# Patient Record
Sex: FEMALE | Race: WHITE | NOT HISPANIC OR LATINO | Employment: OTHER | URBAN - NONMETROPOLITAN AREA
[De-identification: names, ages, dates, MRNs, and addresses within clinical notes are randomized per-mention and may not be internally consistent; named-entity substitution may affect disease eponyms.]

---

## 2018-10-19 ENCOUNTER — HOSPITAL ENCOUNTER (OUTPATIENT)
Facility: HOSPITAL | Age: 73
Setting detail: OBSERVATION
Discharge: HOME | End: 2018-10-21
Attending: EMERGENCY MEDICINE | Admitting: INTERNAL MEDICINE

## 2018-10-19 DIAGNOSIS — R77.8 ELEVATED TROPONIN: Primary | ICD-10-CM

## 2018-10-19 LAB
HOLD SPECIMEN: NORMAL
TROPONIN I SERPL-MCNC: 0.09 NG/ML (ref 0–0.03)
WHOLE BLOOD HOLD SPECIMEN: NORMAL
WHOLE BLOOD HOLD SPECIMEN: NORMAL

## 2018-10-19 PROCEDURE — 93010 ELECTROCARDIOGRAM REPORT: CPT | Performed by: INTERNAL MEDICINE

## 2018-10-19 PROCEDURE — 93005 ELECTROCARDIOGRAM TRACING: CPT

## 2018-10-19 PROCEDURE — 84484 ASSAY OF TROPONIN QUANT: CPT | Performed by: INTERNAL MEDICINE

## 2018-10-19 PROCEDURE — 99285 EMERGENCY DEPT VISIT HI MDM: CPT

## 2018-10-19 PROCEDURE — G0378 HOSPITAL OBSERVATION PER HR: HCPCS

## 2018-10-19 PROCEDURE — 84484 ASSAY OF TROPONIN QUANT: CPT | Performed by: EMERGENCY MEDICINE

## 2018-10-19 PROCEDURE — 93005 ELECTROCARDIOGRAM TRACING: CPT | Performed by: EMERGENCY MEDICINE

## 2018-10-19 RX ORDER — HYDROXYCHLOROQUINE SULFATE 200 MG/1
200 TABLET, FILM COATED ORAL
COMMUNITY

## 2018-10-19 RX ORDER — ACETAMINOPHEN 325 MG/1
650 TABLET ORAL EVERY 4 HOURS PRN
Status: DISCONTINUED | OUTPATIENT
Start: 2018-10-19 | End: 2018-10-21 | Stop reason: HOSPADM

## 2018-10-19 RX ORDER — MELATONIN
2000 2 TIMES DAILY
COMMUNITY

## 2018-10-19 RX ORDER — HYDROXYCHLOROQUINE SULFATE 200 MG/1
100 TABLET, FILM COATED ORAL NIGHTLY
COMMUNITY
End: 2020-07-31 | Stop reason: SDUPTHER

## 2018-10-19 RX ORDER — NITROGLYCERIN 0.4 MG/1
0.4 TABLET SUBLINGUAL
Status: DISCONTINUED | OUTPATIENT
Start: 2018-10-19 | End: 2018-10-21 | Stop reason: HOSPADM

## 2018-10-19 RX ORDER — SODIUM CHLORIDE 0.9 % (FLUSH) 0.9 %
3 SYRINGE (ML) INJECTION EVERY 12 HOURS SCHEDULED
Status: DISCONTINUED | OUTPATIENT
Start: 2018-10-20 | End: 2018-10-21 | Stop reason: HOSPADM

## 2018-10-19 RX ORDER — ASPIRIN 81 MG/1
81 TABLET ORAL DAILY
Status: DISCONTINUED | OUTPATIENT
Start: 2018-10-20 | End: 2018-10-21 | Stop reason: HOSPADM

## 2018-10-19 RX ORDER — ASPIRIN 81 MG/1
81 TABLET, CHEWABLE ORAL DAILY
COMMUNITY
End: 2019-10-29 | Stop reason: ALTCHOICE

## 2018-10-19 RX ORDER — ONDANSETRON 2 MG/ML
4 INJECTION INTRAMUSCULAR; INTRAVENOUS EVERY 6 HOURS PRN
Status: DISCONTINUED | OUTPATIENT
Start: 2018-10-19 | End: 2018-10-21 | Stop reason: HOSPADM

## 2018-10-19 RX ORDER — SODIUM CHLORIDE 0.9 % (FLUSH) 0.9 %
3-10 SYRINGE (ML) INJECTION AS NEEDED
Status: DISCONTINUED | OUTPATIENT
Start: 2018-10-19 | End: 2018-10-21 | Stop reason: HOSPADM

## 2018-10-19 RX ORDER — PREDNISONE 1 MG/1
1 TABLET ORAL 2 TIMES DAILY
COMMUNITY

## 2018-10-19 RX ORDER — CARBIDOPA 25 MG/1
25 TABLET ORAL 3 TIMES DAILY
Status: ON HOLD | COMMUNITY
End: 2018-10-19

## 2018-10-19 RX ORDER — ONDANSETRON 4 MG/1
4 TABLET, FILM COATED ORAL EVERY 6 HOURS PRN
Status: DISCONTINUED | OUTPATIENT
Start: 2018-10-19 | End: 2018-10-21 | Stop reason: HOSPADM

## 2018-10-19 RX ORDER — LANOLIN ALCOHOL/MO/W.PET/CERES
400 CREAM (GRAM) TOPICAL DAILY
COMMUNITY

## 2018-10-19 RX ORDER — RALOXIFENE HYDROCHLORIDE 60 MG/1
60 TABLET, FILM COATED ORAL DAILY
COMMUNITY

## 2018-10-19 RX ORDER — CYCLOBENZAPRINE HCL 10 MG
10 TABLET ORAL 3 TIMES DAILY PRN
COMMUNITY
End: 2019-10-11

## 2018-10-19 RX ORDER — PREDNISONE 1 MG/1
1 TABLET ORAL
Status: DISCONTINUED | OUTPATIENT
Start: 2018-10-20 | End: 2018-10-20

## 2018-10-19 RX ORDER — ATORVASTATIN CALCIUM 40 MG/1
80 TABLET, FILM COATED ORAL NIGHTLY
Status: DISCONTINUED | OUTPATIENT
Start: 2018-10-20 | End: 2018-10-21 | Stop reason: HOSPADM

## 2018-10-19 RX ORDER — HYDROXYCHLOROQUINE SULFATE 200 MG/1
200 TABLET, FILM COATED ORAL DAILY
Status: DISCONTINUED | OUTPATIENT
Start: 2018-10-20 | End: 2018-10-20

## 2018-10-19 RX ORDER — ASPIRIN 81 MG/1
324 TABLET, CHEWABLE ORAL ONCE
Status: DISCONTINUED | OUTPATIENT
Start: 2018-10-19 | End: 2018-10-20 | Stop reason: SDUPTHER

## 2018-10-19 RX ORDER — RALOXIFENE HYDROCHLORIDE 60 MG/1
60 TABLET, FILM COATED ORAL DAILY
Status: DISCONTINUED | OUTPATIENT
Start: 2018-10-20 | End: 2018-10-21 | Stop reason: HOSPADM

## 2018-10-19 RX ORDER — CYCLOBENZAPRINE HCL 10 MG
10 TABLET ORAL 3 TIMES DAILY PRN
Status: DISCONTINUED | OUTPATIENT
Start: 2018-10-19 | End: 2018-10-21 | Stop reason: HOSPADM

## 2018-10-19 RX ORDER — ONDANSETRON 4 MG/1
4 TABLET, ORALLY DISINTEGRATING ORAL EVERY 6 HOURS PRN
Status: DISCONTINUED | OUTPATIENT
Start: 2018-10-19 | End: 2018-10-21 | Stop reason: HOSPADM

## 2018-10-19 RX ORDER — PREDNISONE 1 MG/1
2 TABLET ORAL EVERY OTHER DAY
Status: ON HOLD | COMMUNITY
End: 2019-07-29

## 2018-10-19 RX ORDER — AMOXICILLIN AND CLAVULANATE POTASSIUM 875; 125 MG/1; MG/1
1 TABLET, FILM COATED ORAL EVERY 12 HOURS SCHEDULED
COMMUNITY
Start: 2018-10-10 | End: 2018-10-21 | Stop reason: HOSPADM

## 2018-10-19 RX ORDER — ONDANSETRON 4 MG/1
8 TABLET, FILM COATED ORAL EVERY 12 HOURS PRN
COMMUNITY
End: 2018-10-21 | Stop reason: HOSPADM

## 2018-10-19 RX ORDER — CARBIDOPA 25 MG/1
25 TABLET ORAL 3 TIMES DAILY
Status: DISCONTINUED | OUTPATIENT
Start: 2018-10-20 | End: 2018-10-20

## 2018-10-19 RX ORDER — ASPIRIN 81 MG/1
81 TABLET, CHEWABLE ORAL DAILY
Status: DISCONTINUED | OUTPATIENT
Start: 2018-10-20 | End: 2018-10-19 | Stop reason: SDUPTHER

## 2018-10-20 LAB
ANION GAP SERPL CALCULATED.3IONS-SCNC: 7 MMOL/L (ref 4–13)
ARTICHOKE IGE QN: 66 MG/DL (ref 0–99)
BUN BLD-MCNC: 17 MG/DL (ref 5–21)
BUN/CREAT SERPL: 24.6 (ref 7–25)
CALCIUM SPEC-SCNC: 8.7 MG/DL (ref 8.4–10.4)
CHLORIDE SERPL-SCNC: 107 MMOL/L (ref 98–110)
CHOLEST SERPL-MCNC: 122 MG/DL (ref 130–200)
CO2 SERPL-SCNC: 28 MMOL/L (ref 24–31)
CREAT BLD-MCNC: 0.69 MG/DL (ref 0.5–1.4)
DEPRECATED RDW RBC AUTO: 42.5 FL (ref 40–54)
ERYTHROCYTE [DISTWIDTH] IN BLOOD BY AUTOMATED COUNT: 12.4 % (ref 12–15)
GFR SERPL CREATININE-BSD FRML MDRD: 83 ML/MIN/1.73
GLUCOSE BLD-MCNC: 83 MG/DL (ref 70–100)
HBA1C MFR BLD: 5.6 %
HCT VFR BLD AUTO: 35 % (ref 37–47)
HDLC SERPL-MCNC: 38 MG/DL
HGB BLD-MCNC: 12.6 G/DL (ref 12–16)
LDLC/HDLC SERPL: 1.78 {RATIO}
MCH RBC QN AUTO: 34.1 PG (ref 28–32)
MCHC RBC AUTO-ENTMCNC: 36 G/DL (ref 33–36)
MCV RBC AUTO: 94.6 FL (ref 82–98)
PLATELET # BLD AUTO: 186 10*3/MM3 (ref 130–400)
PMV BLD AUTO: 9.8 FL (ref 6–12)
POTASSIUM BLD-SCNC: 4.2 MMOL/L (ref 3.5–5.3)
RBC # BLD AUTO: 3.7 10*6/MM3 (ref 4.2–5.4)
SODIUM BLD-SCNC: 142 MMOL/L (ref 135–145)
TRIGL SERPL-MCNC: 82 MG/DL (ref 0–149)
TROPONIN I SERPL-MCNC: 0.08 NG/ML (ref 0–0.03)
TROPONIN I SERPL-MCNC: 0.1 NG/ML (ref 0–0.03)
TROPONIN I SERPL-MCNC: 0.11 NG/ML (ref 0–0.03)
WBC NRBC COR # BLD: 7.37 10*3/MM3 (ref 4.8–10.8)

## 2018-10-20 PROCEDURE — 80061 LIPID PANEL: CPT | Performed by: INTERNAL MEDICINE

## 2018-10-20 PROCEDURE — 83036 HEMOGLOBIN GLYCOSYLATED A1C: CPT | Performed by: INTERNAL MEDICINE

## 2018-10-20 PROCEDURE — 84484 ASSAY OF TROPONIN QUANT: CPT | Performed by: INTERNAL MEDICINE

## 2018-10-20 PROCEDURE — 63710000001 METHOTREXATE PER 2.5 MG: Performed by: INTERNAL MEDICINE

## 2018-10-20 PROCEDURE — 25010000002 ENOXAPARIN PER 10 MG: Performed by: INTERNAL MEDICINE

## 2018-10-20 PROCEDURE — 94799 UNLISTED PULMONARY SVC/PX: CPT

## 2018-10-20 PROCEDURE — 99220 PR INITIAL OBSERVATION CARE/DAY 70 MINUTES: CPT | Performed by: INTERNAL MEDICINE

## 2018-10-20 PROCEDURE — 96372 THER/PROPH/DIAG INJ SC/IM: CPT

## 2018-10-20 PROCEDURE — 63710000001 PREDNISONE PER 5 MG: Performed by: INTERNAL MEDICINE

## 2018-10-20 PROCEDURE — 85027 COMPLETE CBC AUTOMATED: CPT | Performed by: INTERNAL MEDICINE

## 2018-10-20 PROCEDURE — G0378 HOSPITAL OBSERVATION PER HR: HCPCS

## 2018-10-20 PROCEDURE — 80048 BASIC METABOLIC PNL TOTAL CA: CPT | Performed by: INTERNAL MEDICINE

## 2018-10-20 PROCEDURE — 94760 N-INVAS EAR/PLS OXIMETRY 1: CPT

## 2018-10-20 RX ORDER — HYDROXYCHLOROQUINE SULFATE 200 MG/1
100 TABLET, FILM COATED ORAL EVERY EVENING
Status: DISCONTINUED | OUTPATIENT
Start: 2018-10-20 | End: 2018-10-21 | Stop reason: HOSPADM

## 2018-10-20 RX ORDER — PREDNISONE 1 MG/1
2 TABLET ORAL EVERY OTHER DAY
Status: DISCONTINUED | OUTPATIENT
Start: 2018-10-21 | End: 2018-10-21 | Stop reason: HOSPADM

## 2018-10-20 RX ORDER — PREDNISONE 1 MG/1
1 TABLET ORAL EVERY OTHER DAY
Status: DISCONTINUED | OUTPATIENT
Start: 2018-10-20 | End: 2018-10-21 | Stop reason: HOSPADM

## 2018-10-20 RX ORDER — HYDROXYCHLOROQUINE SULFATE 200 MG/1
200 TABLET, FILM COATED ORAL
Status: DISCONTINUED | OUTPATIENT
Start: 2018-10-20 | End: 2018-10-21 | Stop reason: HOSPADM

## 2018-10-20 RX ADMIN — ONDANSETRON 4 MG: 4 TABLET, FILM COATED ORAL at 11:18

## 2018-10-20 RX ADMIN — HYDROXYCHLOROQUINE SULFATE 200 MG: 200 TABLET ORAL at 07:50

## 2018-10-20 RX ADMIN — PREDNISONE 1 MG: 1 TABLET ORAL at 07:51

## 2018-10-20 RX ADMIN — RALOXIFENE HYDROCHLORIDE 60 MG: 60 TABLET, FILM COATED ORAL at 07:50

## 2018-10-20 RX ADMIN — ASPIRIN 81 MG: 81 TABLET ORAL at 07:50

## 2018-10-20 RX ADMIN — ATORVASTATIN CALCIUM 80 MG: 40 TABLET, FILM COATED ORAL at 20:34

## 2018-10-20 RX ADMIN — CARBIDOPA AND LEVODOPA 1.5 TABLET: 25; 100 TABLET ORAL at 17:23

## 2018-10-20 RX ADMIN — HYDROXYCHLOROQUINE SULFATE 100 MG: 200 TABLET ORAL at 17:23

## 2018-10-20 RX ADMIN — Medication 3 ML: at 20:35

## 2018-10-20 RX ADMIN — Medication 3 ML: at 00:38

## 2018-10-20 RX ADMIN — ATORVASTATIN CALCIUM 80 MG: 40 TABLET, FILM COATED ORAL at 00:38

## 2018-10-20 RX ADMIN — Medication 3 ML: at 07:50

## 2018-10-20 RX ADMIN — METHOTREXATE 10 MG: 2.5 TABLET ORAL at 07:50

## 2018-10-20 RX ADMIN — ENOXAPARIN SODIUM 40 MG: 40 INJECTION SUBCUTANEOUS at 07:49

## 2018-10-20 NOTE — ED NOTES
Pts sister was called to make her aware of pt admission. Per pt request        Colin Colin RN  10/19/18 6920

## 2018-10-20 NOTE — H&P
[facility], [facility] History and Physical      Patient Care Team:  Amadou Rowan MD as PCP - General  Amadou Rowan MD as PCP - Family Medicine    Chief complaint CP    Subjective     Patient is a 73 y.o. female without previous CAD.  She has a h/o stroke and residual dysarthric speech.  She is difficult to understand and most of her hx is taken from RN and medical records.  She presented to [facility] with cp and was transferred here for minimally elevated troponin.  She is presently CP free.    Review of Systems  A 10-point review of systems is obtained and negative except for otherwise mentioned above.    History  Past Medical History:   Diagnosis Date   • COPD (chronic obstructive pulmonary disease) (CMS/HCC)    • Stroke (CMS/HCC)      History reviewed. No pertinent surgical history.  History reviewed. No pertinent family history.  Social History   Substance Use Topics   • Smoking status: Never Smoker   • Smokeless tobacco: Never Used   • Alcohol use No     Prescriptions Prior to Admission   Medication Sig Dispense Refill Last Dose   • amoxicillin-clavulanate (AUGMENTIN) 875-125 MG per tablet Take 1 tablet by mouth Every 12 (Twelve) Hours. One dose remaining   10/19/2018 at 2300   • aspirin 81 MG chewable tablet Chew 81 mg Daily.   10/19/2018 at 1900   • carbidopa-levodopa (SINEMET)  MG per tablet Take 1.5 tablets by mouth 2 (Two) Times a Day With Meals.   10/19/2018 at 1800   • cholecalciferol (VITAMIN D3) 1000 units tablet Take 1,000 Units by mouth 2 (Two) Times a Day.   10/19/2018 at 1800   • folic acid (FOLVITE) 800 MCG tablet Take 800 mcg by mouth Daily.   10/19/2018 at 0700   • hydroxychloroquine (PLAQUENIL) 200 MG tablet Take 200 mg by mouth Daily With Breakfast.   10/19/2018 at 0700   • hydroxychloroquine (PLAQUENIL) 200 MG tablet Take 100 mg by mouth Every Night.   10/19/2018 at 1800   • methotrexate 2.5 MG tablet Take 10 mg by mouth 1 (One) Time  Per Week. On Saturdays   10/13/2018 at 0700   • Multiple Vitamins-Minerals (WOMENS ONE DAILY PO) Take 1 tablet by mouth.   10/19/2018 at 1800   • ondansetron (ZOFRAN) 4 MG tablet Take 8 mg by mouth Every 12 (Twelve) Hours As Needed for Nausea or Vomiting.   10/19/2018 at 0600   • predniSONE (DELTASONE) 1 MG tablet Take 1 mg by mouth Every Other Day.   10/18/2018 at 0700   • predniSONE (DELTASONE) 1 MG tablet Take 2 mg by mouth Every Other Day. Alternate 2 tabs one day, 1 tab other day   10/19/2018 at 0700   • raloxifene (EVISTA) 60 MG tablet Take 60 mg by mouth Daily.   10/19/2018 at 0700   • cyclobenzaprine (FLEXERIL) 10 MG tablet Take 10 mg by mouth 3 (Three) Times a Day As Needed for Muscle Spasms.   More than a month at Unknown time     Allergies:  Patient has no known allergies.    Objective     Vital Signs  Temp:  [97.7 °F (36.5 °C)-98.8 °F (37.1 °C)] 97.7 °F (36.5 °C)  Heart Rate:  [62-86] 68  Resp:  [14-20] 18  BP: (127-168)/(57-99) 137/57    Physical Exam:     General Appearance:    Alert, cooperative, in no acute distress   Head:    Normocephalic, without obvious abnormality, atraumatic   Eyes:            Lids and lashes normal, conjunctivae and sclerae normal, no   icterus, PERRLA, EOMI   Throat:   Oral mucosa pink and moist   Neck:   No adenopathy, supple, trachea midline, no thyromegaly, no   carotid bruit, no JVD   Lungs:     Clear to auscultation bilaterally,respirations regular, even     and unlabored    Heart:    Regular rhythm and normal rate, normal S1 and S2, no            murmur, no gallop, no rub, no click   Chest Wall:    No abnormalities observed   Abdomen:     Normal bowel sounds present in all four quadrants, no       masses, no organomegaly, soft non-tender, non-distended    Extremities:   No edema, no cyanosis, no clubbing   Pulses:   Pulses palpable and equal bilaterally   Skin:   No bleeding, bruising or rash   Psychiatric:   Displays appropriate mood and affect     Results Review:    I  reviewed the patient's new clinical results.      Results from last 7 days  Lab Units 10/20/18  0433   WBC 10*3/mm3 7.37   HEMOGLOBIN g/dL 12.6   HEMATOCRIT % 35.0*   PLATELETS 10*3/mm3 186       Results from last 7 days  Lab Units 10/20/18  0433   SODIUM mmol/L 142   POTASSIUM mmol/L 4.2   CHLORIDE mmol/L 107   CO2 mmol/L 28.0   BUN mg/dL 17   CREATININE mg/dL 0.69   GLUCOSE mg/dL 83   CALCIUM mg/dL 8.7       Results from last 7 days  Lab Units 10/20/18  0433   SODIUM mmol/L 142   POTASSIUM mmol/L 4.2   CHLORIDE mmol/L 107   CO2 mmol/L 28.0   BUN mg/dL 17   CREATININE mg/dL 0.69   CALCIUM mg/dL 8.7   GLUCOSE mg/dL 83       Results from last 7 days  Lab Units 10/20/18  0433 10/19/18  2336 10/19/18  2103   TROPONIN I ng/mL 0.107* 0.082* 0.085*           Results from last 7 days  Lab Units 10/20/18  0433   CHOLESTEROL mg/dL 122*   TRIGLYCERIDES mg/dL 82   HDL CHOL mg/dL 38*   LDL CHOL mg/dL 66         Assessment/Plan       Elevated troponin  CP  H/o CVA  Dysarthric speech    Will get lexiscan cardiolite.  Pt is presently CP free.  Further recommendations following test.      I discussed the patients findings and my recommendations with patient and nursing staff.     Maame Morales PA-C  10/20/18  9:28 AM

## 2018-10-20 NOTE — PROGRESS NOTES
Malnutrition Severity Assessment    Patient Name:  Kristen Mittal  YOB: 1945  MRN: [id number]  Admit Date:  10/19/2018    Patient meets criteria for : Moderate malnutrition    Comments:  If in agreement with malnutrition assessment, please attest documentation. Thanks.    Malnutrition Type: Chronic Illness Malnutrition     Malnutrition Type (last 8 hours)      Malnutrition Severity Assessment     Row Name 10/20/18 1248       Malnutrition Severity Assessment    Malnutrition Type Chronic Illness Malnutrition    Row Name 10/20/18 1248       Physical Signs of Malnutrition (Chronic)    Muscle Wasting Mild   scooping temporalis; loss of pectoris & deltoid with clavicle visible & acromion process easily palpable; depression of interroseous muscle; scapula visible with palpable tone loss of trapezius muscle; calf & muscles around knee not well-developed    Fat Loss Mild   some depression of orbtail fat pad    Row Name 10/20/18 1248       Weight Status (Chronic)    %UBW Mild (86-90%)   87%    Weight Loss Severe (>10% / 6 mo)   13% (~17 lbs)    Row Name 10/20/18 1248       Energy Intake Status (Chronic)    Energy Intake Mod (<75% / > or equal to 1 mo)    Row Name 10/20/18 1248       Criteria Met (Must meet criteria for severity in at least 2 of these categories: M Wasting, Fat Loss, Fluid, Secondary Signs, Wt. Status, Intake)    Patient meets criteria for  Moderate malnutrition          Electronically signed by:  Nery Martínez RDN, LD  10/20/18 12:58 PM

## 2018-10-20 NOTE — PLAN OF CARE
Problem: Patient Care Overview  Goal: Plan of Care Review  Outcome: Ongoing (interventions implemented as appropriate)   10/20/18 0611   Coping/Psychosocial   Plan of Care Reviewed With patient   Plan of Care Review   Progress improving   OTHER   Outcome Summary No c/o chest pain this shift. Troponins elevated = 0.08, 0.09, 0.10. Pt held NPO after MN for cardiac testing. Pt has dysarthric speech but denies having a stroke as per ER report; pt states her speech has been impaired since her 's death. VSS. SR on tele.       Problem: Cardiac: ACS (Acute Coronary Syndrome) (Adult)  Goal: Signs and Symptoms of Listed Potential Problems Will be Absent, Minimized or Managed (Cardiac: ACS)  Outcome: Ongoing (interventions implemented as appropriate)      Problem: Communication Impaired, Verbal (Adult,Obstetrics,Pediatric)  Goal: Identify Related Risk Factors and Signs and Symptoms  Outcome: Outcome(s) achieved Date Met: 10/20/18    Goal: Improved/Effective Communication  Outcome: Ongoing (interventions implemented as appropriate)

## 2018-10-20 NOTE — PLAN OF CARE
Problem: Patient Care Overview  Goal: Plan of Care Review  Outcome: Ongoing (interventions implemented as appropriate)   10/20/18 1701   Coping/Psychosocial   Plan of Care Reviewed With patient   Plan of Care Review   Progress no change   OTHER   Outcome Summary VSS. Denies pain this shift. NSR on tele. TERRENCE scan tomorrow, NPO after MN. Will cont to monitor.       Problem: Cardiac: ACS (Acute Coronary Syndrome) (Adult)  Goal: Signs and Symptoms of Listed Potential Problems Will be Absent, Minimized or Managed (Cardiac: ACS)  Outcome: Ongoing (interventions implemented as appropriate)      Problem: Communication Impaired, Verbal (Adult,Obstetrics,Pediatric)  Goal: Improved/Effective Communication  Outcome: Ongoing (interventions implemented as appropriate)      Problem: Nutrition, Imbalanced: Inadequate Oral Intake (Adult)  Goal: Improved Oral Intake  Outcome: Ongoing (interventions implemented as appropriate)    Goal: Prevent Further Weight Loss  Outcome: Ongoing (interventions implemented as appropriate)

## 2018-10-20 NOTE — PLAN OF CARE
Problem: Patient Care Overview  Goal: Plan of Care Review  Outcome: Outcome(s) achieved Date Met: 10/20/18   10/20/18 1255   Coping/Psychosocial   Plan of Care Reviewed With patient;sibling   Plan of Care Review   Progress no change   OTHER   Outcome Summary Initial RDN eval. Pt and her sister indicate Pt's appetite has been reduced at home for quite some time; She's been losing wt ~17 lbs (13%) over the last estimated 6 months. She is agreeable to starting supplements BID. Encouraged intake and educated Pt/sister on importance of appropriate kcal/protein intake to maintain wt. Will continue to follow.       Problem: Nutrition, Imbalanced: Inadequate Oral Intake (Adult)  Goal: Identify Related Risk Factors and Signs and Symptoms  Outcome: Outcome(s) achieved Date Met: 10/20/18   10/20/18 1255   Nutrition, Imbalanced: Inadequate Oral Intake (Adult)   Related Risk Factors (Nutrition Imbalance, Inadequate Oral Intake) hypercatabolism;satiety early;appetite decreased   Signs and Symptoms (Nutrition Imbalance, Inadequate Oral Intake: Signs and Symptoms) loss of subcutaneous fat;muscle mass/strength decreased;weight decreased (percent weight loss, percent usual body weight, body mass index less than 18.5) (Adults)     Goal: Improved Oral Intake  Outcome: Ongoing (interventions implemented as appropriate)   10/20/18 1255   Nutrition, Imbalanced: Inadequate Oral Intake (Adult)   Improved Oral Intake making progress toward outcome     Goal: Prevent Further Weight Loss  Outcome: Ongoing (interventions implemented as appropriate)   10/20/18 1255   Nutrition, Imbalanced: Inadequate Oral Intake (Adult)   Prevent Further Weight Loss making progress toward outcome

## 2018-10-20 NOTE — PROGRESS NOTES
Discharge Planning Assessment  [facility]     Patient Name: Kristen Mittal  MRN: [id number]  Today's Date: 10/19/2018    Admit Date: 10/19/2018          Discharge Needs Assessment     Row Name 10/19/18 6742       Living Environment    Lives With sibling(s)    Name(s) of Who Lives With Patient Alyssa    Unique Family Situation Pt has very difficult time with speaking due to speech deficit.  Has 3 children, I think, that live out of town    Current Living Arrangements home/apartment/condo    Primary Care Provided by self    Provides Primary Care For no one    Family Caregiver if Needed sibling(s)    Quality of Family Relationships supportive    Able to Return to Prior Arrangements yes       Resource/Environmental Concerns    Resource/Environmental Concerns none       Transition Planning    Patient/Family Anticipates Transition to home    Patient/Family Anticipated Services at Transition none    Transportation Anticipated family or friend will provide       Discharge Needs Assessment    Readmission Within the Last 30 Days no previous admission in last 30 days    Concerns to be Addressed no discharge needs identified    Equipment Currently Used at Home none    Anticipated Changes Related to Illness none    Equipment Needed After Discharge none    Offered/Gave Vendor List no    Discharge Coordination/Progress Pt has very difficult time with speaking due to speech deficit.  Has 3 children, I think, that live out of town            Discharge Plan     Row Name 10/19/18 4961       Plan    Plan No needs identified at this time.    Patient/Family in Agreement with Plan yes        Destination     No service coordination in this encounter.      Durable Medical Equipment     No service coordination in this encounter.      Dialysis/Infusion     No service coordination in this encounter.      Home Medical Care     No service coordination in this encounter.      Social Care     No service coordination in this encounter.                 Demographic Summary    No documentation.           Functional Status    No documentation.           Psychosocial    No documentation.           Abuse/Neglect    No documentation.           Legal    No documentation.           Substance Abuse    No documentation.           Patient Forms    No documentation.         Eleanor Pinto RN

## 2018-10-20 NOTE — ED PROVIDER NOTES
Subjective   History of Present Illness   73-year-old female presenting with chest pain.    Patient is a transfer from [facility] for an STEMI. Patient reports one week of stuttering chest pain. Intermittent, daily, worse at night, worse with laying flat. Pain is substernal, burning/pressure, radiating to bilateral shoulders. Unclear association with exertion. Pain was associated with nausea and some difficulty breathing. No alleviating factors. Patient never had this pain before.    At the outside emergency department patient had an elevated troponin to 0.11. Patient was transferred to [facility] for further cardiac workup.    Patient denies any associated fevers, cough, chills, vomiting, diarrhea, abdominal pain, lower extremity swelling.    Review of Systems   Constitutional: Negative for chills and fever.   HENT: Negative for nosebleeds.    Eyes: Negative for redness.   Respiratory: Positive for shortness of breath.    Cardiovascular: Positive for chest pain.   Gastrointestinal: Positive for nausea. Negative for abdominal pain and vomiting.   Genitourinary: Negative for flank pain.   Musculoskeletal: Negative for gait problem.   Skin: Negative for wound.   Neurological: Negative for syncope and weakness.   Psychiatric/Behavioral: The patient is not hyperactive.        Past Medical History:   Diagnosis Date   • COPD (chronic obstructive pulmonary disease) (CMS/[facility])    • Stroke (CMS/[facility])        No Known Allergies    History reviewed. No pertinent surgical history.    History reviewed. No pertinent family history.    Social History     Social History   • Marital status:      Social History Main Topics   • Smoking status: Never Smoker   • Smokeless tobacco: Never Used   • Alcohol use No   • Drug use: No     Other Topics Concern   • Not on file           Objective   Physical Exam   Constitutional: She is oriented to person, place, and time. She appears well-developed and well-nourished.    HENT:   Head: Normocephalic and atraumatic.   Eyes: Conjunctivae are normal.   Neck: Normal range of motion.   Cardiovascular: Normal rate, regular rhythm and intact distal pulses.    Pulmonary/Chest: Effort normal and breath sounds normal. No respiratory distress.   Abdominal: Soft. She exhibits no distension. There is no tenderness.   Musculoskeletal: She exhibits no edema.   Neurological: She is alert and oriented to person, place, and time.   Skin: Skin is warm and dry.   Psychiatric: She has a normal mood and affect.   Nursing note and vitals reviewed.      Procedures           ED Course  ED Course as of Oct 19 2239   Fri Oct 19, 2018   2201 Heart Rate: 86 [NR]      ED Course User Index  [NR] Jose Ríos MD                  MDM  Number of Diagnoses or Management Options  Diagnosis management comments: 73-year-old female presenting with chest pain and elevated troponin. Pain is atypical on presentation but patient does have a right bundle branch block which was not present on previous EKG over ten years ago. There are no acute ischemic changes on the EKG today. Troponin here is downtrending to 0.089. Case will be discussed with cardiology for admission for further diagnostics.    Patient was given 324 aspirin at the outside ED       Amount and/or Complexity of Data Reviewed  Clinical lab tests: reviewed  Tests in the medicine section of CPT®: reviewed    Update:  Patient is admitted to the cardiology service.      Final diagnoses:   Elevated troponin            Jose Ríos MD  10/19/18 2239

## 2018-10-21 ENCOUNTER — APPOINTMENT (OUTPATIENT)
Dept: CARDIOLOGY | Facility: HOSPITAL | Age: 73
End: 2018-10-21

## 2018-10-21 VITALS
HEIGHT: 62 IN | TEMPERATURE: 97.2 F | WEIGHT: 112 LBS | RESPIRATION RATE: 18 BRPM | BODY MASS INDEX: 20.61 KG/M2 | DIASTOLIC BLOOD PRESSURE: 62 MMHG | HEART RATE: 80 BPM | SYSTOLIC BLOOD PRESSURE: 125 MMHG | OXYGEN SATURATION: 100 %

## 2018-10-21 LAB
BH CV NUCLEAR PRIOR STUDY: 3
BH CV STRESS BP STAGE 1: NORMAL
BH CV STRESS COMMENTS STAGE 1: NORMAL
BH CV STRESS DOSE REGADENOSON STAGE 1: 0.4
BH CV STRESS DURATION MIN STAGE 1: 0
BH CV STRESS DURATION SEC STAGE 1: 10
BH CV STRESS HR STAGE 1: 87
BH CV STRESS PROTOCOL 1: NORMAL
BH CV STRESS RECOVERY BP: NORMAL MMHG
BH CV STRESS RECOVERY HR: 74 BPM
BH CV STRESS STAGE 1: 1
LV EF NUC BP: 66 %
MAXIMAL PREDICTED HEART RATE: 145 BPM
PERCENT MAX PREDICTED HR: 60 %
STRESS BASELINE BP: NORMAL MMHG
STRESS BASELINE HR: 73 BPM
STRESS PERCENT HR: 71 %
STRESS POST EXERCISE DUR MIN: 0 MIN
STRESS POST EXERCISE DUR SEC: 10 SEC
STRESS POST PEAK BP: NORMAL MMHG
STRESS POST PEAK HR: 87 BPM
STRESS TARGET HR: 123 BPM

## 2018-10-21 PROCEDURE — A9500 TC99M SESTAMIBI: HCPCS | Performed by: INTERNAL MEDICINE

## 2018-10-21 PROCEDURE — 25010000002 ENOXAPARIN PER 10 MG: Performed by: INTERNAL MEDICINE

## 2018-10-21 PROCEDURE — G0378 HOSPITAL OBSERVATION PER HR: HCPCS

## 2018-10-21 PROCEDURE — 96372 THER/PROPH/DIAG INJ SC/IM: CPT

## 2018-10-21 PROCEDURE — 78452 HT MUSCLE IMAGE SPECT MULT: CPT | Performed by: INTERNAL MEDICINE

## 2018-10-21 PROCEDURE — 93018 CV STRESS TEST I&R ONLY: CPT | Performed by: INTERNAL MEDICINE

## 2018-10-21 PROCEDURE — 25010000002 REGADENOSON 0.4 MG/5ML SOLUTION: Performed by: INTERNAL MEDICINE

## 2018-10-21 PROCEDURE — 0 TECHNETIUM SESTAMIBI: Performed by: INTERNAL MEDICINE

## 2018-10-21 PROCEDURE — 78452 HT MUSCLE IMAGE SPECT MULT: CPT

## 2018-10-21 PROCEDURE — 96374 THER/PROPH/DIAG INJ IV PUSH: CPT

## 2018-10-21 PROCEDURE — 93017 CV STRESS TEST TRACING ONLY: CPT

## 2018-10-21 PROCEDURE — 63710000001 PREDNISONE PER 5 MG: Performed by: INTERNAL MEDICINE

## 2018-10-21 PROCEDURE — 25010000002 ONDANSETRON PER 1 MG: Performed by: INTERNAL MEDICINE

## 2018-10-21 RX ORDER — ATORVASTATIN CALCIUM 80 MG/1
80 TABLET, FILM COATED ORAL NIGHTLY
Qty: 30 TABLET | Refills: 11 | Status: SHIPPED | OUTPATIENT
Start: 2018-10-21 | End: 2019-10-11 | Stop reason: ALTCHOICE

## 2018-10-21 RX ADMIN — CARBIDOPA AND LEVODOPA 1.5 TABLET: 25; 100 TABLET ORAL at 12:19

## 2018-10-21 RX ADMIN — REGADENOSON 0.4 MG: 0.08 INJECTION, SOLUTION INTRAVENOUS at 10:15

## 2018-10-21 RX ADMIN — PREDNISONE 2 MG: 1 TABLET ORAL at 08:29

## 2018-10-21 RX ADMIN — TECHNETIUM TC 99M SESTAMIBI 1 DOSE: 1 INJECTION INTRAVENOUS at 11:00

## 2018-10-21 RX ADMIN — ENOXAPARIN SODIUM 40 MG: 40 INJECTION SUBCUTANEOUS at 08:33

## 2018-10-21 RX ADMIN — ONDANSETRON HYDROCHLORIDE 4 MG: 2 SOLUTION INTRAMUSCULAR; INTRAVENOUS at 08:29

## 2018-10-21 RX ADMIN — RALOXIFENE HYDROCHLORIDE 60 MG: 60 TABLET, FILM COATED ORAL at 08:29

## 2018-10-21 RX ADMIN — HYDROXYCHLOROQUINE SULFATE 200 MG: 200 TABLET ORAL at 08:30

## 2018-10-21 RX ADMIN — Medication 3 ML: at 08:29

## 2018-10-21 RX ADMIN — ASPIRIN 81 MG: 81 TABLET ORAL at 08:30

## 2018-10-21 RX ADMIN — TECHNETIUM TC 99M SESTAMIBI 1 DOSE: 1 INJECTION INTRAVENOUS at 08:25

## 2018-10-21 RX ADMIN — CAFFEINE CITRATE 60 MG: 20 INJECTION, SOLUTION INTRAVENOUS at 10:28

## 2018-10-21 NOTE — DISCHARGE SUMMARY
[facility], [facility] Discharge Summary    Date of Discharge:  10/21/2018    Discharge Diagnosis:   Elevated troponin with normal nuclear stress test  Atypical cp  H/o cva    Hospital Course  Patient is a 73 y.o. female presented with CP and elevated troponin.  She had nuclear stress testing which was normal.  She has had no further cp and is stable for d/c.      Physical Exam at Discharge    Vital Signs  Temp:  [97 °F (36.1 °C)-98.3 °F (36.8 °C)] 97.2 °F (36.2 °C)  Heart Rate:  [64-80] 80  Resp:  [18] 18  BP: (105-159)/(52-66) 125/62    Physical Exam:  General Appearance:    Alert, cooperative, in no acute distress   Head:    Normocephalic, without obvious abnormality, atraumatic   Eyes:            Lids and lashes normal, conjunctivae and sclerae normal, no   icterus, PERRLA, EOMI   Throat:   Oral mucosa pink and moist   Neck:   No adenopathy, supple, trachea midline, no thyromegaly, no   carotid bruit, no JVD   Lungs:     Clear to auscultation bilaterally,respirations regular, even     and unlabored    Heart:    Regular rhythm and normal rate, normal S1 and S2, no            murmur, no gallop, no rub, no click   Chest Wall:    No abnormalities observed   Abdomen:     Normal bowel sounds present in all four quadrants, no       masses, no organomegaly, soft non-tender, non-distended    Extremities:   No edema, no cyanosis, no clubbing   Pulses:   Pulses palpable and equal bilaterally   Skin:   No bleeding, bruising or rash   Psychiatric:   Displays appropriate mood and affect       Discharge Disposition  Home or Self Care    Discharge Medications     Discharge Medications      New Medications      Instructions Start Date   atorvastatin 80 MG tablet  Commonly known as:  LIPITOR   80 mg, Oral, Nightly         Continue These Medications      Instructions Start Date   aspirin 81 MG chewable tablet   81 mg, Oral, Daily      carbidopa-levodopa  MG per tablet  Commonly known as:   SINEMET   1.5 tablets, Oral, 2 Times Daily With Meals      cholecalciferol 1000 units tablet  Commonly known as:  VITAMIN D3   1,000 Units, Oral, 2 Times Daily      cyclobenzaprine 10 MG tablet  Commonly known as:  FLEXERIL   10 mg, Oral, 3 Times Daily PRN      folic acid 800 MCG tablet  Commonly known as:  FOLVITE   800 mcg, Oral, Daily      hydroxychloroquine 200 MG tablet  Commonly known as:  PLAQUENIL   200 mg, Oral, Daily With Breakfast      hydroxychloroquine 200 MG tablet  Commonly known as:  PLAQUENIL   100 mg, Oral, Nightly      methotrexate 2.5 MG tablet   10 mg, Oral, Weekly, On Saturdays      predniSONE 1 MG tablet  Commonly known as:  DELTASONE   1 mg, Oral, Every Other Day      predniSONE 1 MG tablet  Commonly known as:  DELTASONE   2 mg, Oral, Every Other Day, Alternate 2 tabs one day, 1 tab other day       raloxifene 60 MG tablet  Commonly known as:  EVISTA   60 mg, Oral, Daily      WOMENS ONE DAILY PO   1 tablet, Oral         Stop These Medications    amoxicillin-clavulanate 875-125 MG per tablet  Commonly known as:  AUGMENTIN     ondansetron 4 MG tablet  Commonly known as:  ZOFRAN            Follow-up Appointments  No future appointments.  Additional Instructions for the Follow-ups that You Need to Schedule     Discharge Follow-up with PCP    As directed      Currently Documented PCP:  Amadou Rowan MD  PCP Phone Number:  [phone]    Follow Up Details:  1 week               Maame Morales PA-C  10/21/18  12:24 PM

## 2018-10-21 NOTE — PLAN OF CARE
Problem: Patient Care Overview  Goal: Plan of Care Review  Outcome: Ongoing (interventions implemented as appropriate)   10/20/18 2000 10/21/18 0247   Coping/Psychosocial   Plan of Care Reviewed With patient --    Plan of Care Review   Progress --  improving   OTHER   Outcome Summary --  VSS. NO C/O PAIN OR SOA. NSR ON TELEMETRY. LEXISCAN SCHEDULE FOR AM. WILL CONTINUE TO MONITOR AND NOTIFY MD OF CHANGES.     Goal: Individualization and Mutuality  Outcome: Ongoing (interventions implemented as appropriate)      Problem: Cardiac: ACS (Acute Coronary Syndrome) (Adult)  Goal: Signs and Symptoms of Listed Potential Problems Will be Absent, Minimized or Managed (Cardiac: ACS)  Outcome: Ongoing (interventions implemented as appropriate)      Problem: Communication Impaired, Verbal (Adult,Obstetrics,Pediatric)  Goal: Improved/Effective Communication  Outcome: Ongoing (interventions implemented as appropriate)      Problem: Nutrition, Imbalanced: Inadequate Oral Intake (Adult)  Goal: Improved Oral Intake  Outcome: Ongoing (interventions implemented as appropriate)    Goal: Prevent Further Weight Loss  Outcome: Ongoing (interventions implemented as appropriate)

## 2019-07-29 ENCOUNTER — HOSPITAL ENCOUNTER (INPATIENT)
Facility: HOSPITAL | Age: 74
LOS: 1 days | Discharge: HOME | End: 2019-07-30
Attending: EMERGENCY MEDICINE | Admitting: INTERNAL MEDICINE

## 2019-07-29 ENCOUNTER — APPOINTMENT (OUTPATIENT)
Dept: GENERAL RADIOLOGY | Facility: HOSPITAL | Age: 74
End: 2019-07-29

## 2019-07-29 DIAGNOSIS — I45.9 HEART BLOCK: ICD-10-CM

## 2019-07-29 DIAGNOSIS — I44.1 SECOND DEGREE AV BLOCK, MOBITZ TYPE II: Primary | ICD-10-CM

## 2019-07-29 DIAGNOSIS — R00.1 SYMPTOMATIC BRADYCARDIA: ICD-10-CM

## 2019-07-29 LAB
HOLD SPECIMEN: NORMAL
HOLD SPECIMEN: NORMAL
TROPONIN I SERPL-MCNC: 0.09 NG/ML (ref 0–0.03)
TSH SERPL DL<=0.05 MIU/L-ACNC: 2.15 MIU/ML (ref 0.47–4.68)
WHOLE BLOOD HOLD SPECIMEN: NORMAL
WHOLE BLOOD HOLD SPECIMEN: NORMAL

## 2019-07-29 PROCEDURE — C1892 INTRO/SHEATH,FIXED,PEEL-AWAY: HCPCS | Performed by: INTERNAL MEDICINE

## 2019-07-29 PROCEDURE — 63710000001 PREDNISONE PER 5 MG: Performed by: INTERNAL MEDICINE

## 2019-07-29 PROCEDURE — 33208 INSRT HEART PM ATRIAL & VENT: CPT | Performed by: INTERNAL MEDICINE

## 2019-07-29 PROCEDURE — C1898 LEAD, PMKR, OTHER THAN TRANS: HCPCS | Performed by: INTERNAL MEDICINE

## 2019-07-29 PROCEDURE — 71045 X-RAY EXAM CHEST 1 VIEW: CPT

## 2019-07-29 PROCEDURE — 02H63JZ INSERTION OF PACEMAKER LEAD INTO RIGHT ATRIUM, PERCUTANEOUS APPROACH: ICD-10-PCS | Performed by: INTERNAL MEDICINE

## 2019-07-29 PROCEDURE — 99222 1ST HOSP IP/OBS MODERATE 55: CPT | Performed by: INTERNAL MEDICINE

## 2019-07-29 PROCEDURE — 93010 ELECTROCARDIOGRAM REPORT: CPT | Performed by: INTERNAL MEDICINE

## 2019-07-29 PROCEDURE — 25010000002 FENTANYL CITRATE (PF) 100 MCG/2ML SOLUTION: Performed by: INTERNAL MEDICINE

## 2019-07-29 PROCEDURE — 0JH606Z INSERTION OF PACEMAKER, DUAL CHAMBER INTO CHEST SUBCUTANEOUS TISSUE AND FASCIA, OPEN APPROACH: ICD-10-PCS | Performed by: INTERNAL MEDICINE

## 2019-07-29 PROCEDURE — 25010000002 DIPHENHYDRAMINE PER 50 MG: Performed by: INTERNAL MEDICINE

## 2019-07-29 PROCEDURE — 93005 ELECTROCARDIOGRAM TRACING: CPT | Performed by: INTERNAL MEDICINE

## 2019-07-29 PROCEDURE — 84484 ASSAY OF TROPONIN QUANT: CPT | Performed by: EMERGENCY MEDICINE

## 2019-07-29 PROCEDURE — 02HK3JZ INSERTION OF PACEMAKER LEAD INTO RIGHT VENTRICLE, PERCUTANEOUS APPROACH: ICD-10-PCS | Performed by: INTERNAL MEDICINE

## 2019-07-29 PROCEDURE — 99284 EMERGENCY DEPT VISIT MOD MDM: CPT

## 2019-07-29 PROCEDURE — 25010000003 CEFAZOLIN 1-4 GM/50ML-% SOLUTION: Performed by: INTERNAL MEDICINE

## 2019-07-29 PROCEDURE — 25010000002 MIDAZOLAM PER 1 MG: Performed by: INTERNAL MEDICINE

## 2019-07-29 PROCEDURE — C1785 PMKR, DUAL, RATE-RESP: HCPCS | Performed by: INTERNAL MEDICINE

## 2019-07-29 PROCEDURE — 93005 ELECTROCARDIOGRAM TRACING: CPT | Performed by: EMERGENCY MEDICINE

## 2019-07-29 PROCEDURE — 84443 ASSAY THYROID STIM HORMONE: CPT | Performed by: EMERGENCY MEDICINE

## 2019-07-29 DEVICE — GEN PM ASSURITY MRI DR RF PM2272: Type: IMPLANTABLE DEVICE | Status: FUNCTIONAL

## 2019-07-29 DEVICE — LD PM TENDRIL STS 6F52CM 2088TC52: Type: IMPLANTABLE DEVICE | Status: FUNCTIONAL

## 2019-07-29 DEVICE — LD PM TENDRIL STS 6F46CM 2088TC46: Type: IMPLANTABLE DEVICE | Status: FUNCTIONAL

## 2019-07-29 RX ORDER — SODIUM CHLORIDE 0.9 % (FLUSH) 0.9 %
3 SYRINGE (ML) INJECTION EVERY 12 HOURS SCHEDULED
Status: DISCONTINUED | OUTPATIENT
Start: 2019-07-29 | End: 2019-07-30 | Stop reason: HOSPADM

## 2019-07-29 RX ORDER — HYDROXYCHLOROQUINE SULFATE 200 MG/1
100 TABLET, FILM COATED ORAL NIGHTLY
Status: DISCONTINUED | OUTPATIENT
Start: 2019-07-29 | End: 2019-07-30 | Stop reason: HOSPADM

## 2019-07-29 RX ORDER — DIPHENHYDRAMINE HYDROCHLORIDE 50 MG/ML
25 INJECTION INTRAMUSCULAR; INTRAVENOUS NIGHTLY PRN
Status: DISCONTINUED | OUTPATIENT
Start: 2019-07-29 | End: 2019-07-30 | Stop reason: HOSPADM

## 2019-07-29 RX ORDER — PREDNISONE 1 MG/1
2 TABLET ORAL 2 TIMES DAILY
Status: DISCONTINUED | OUTPATIENT
Start: 2019-07-29 | End: 2019-07-30 | Stop reason: HOSPADM

## 2019-07-29 RX ORDER — ACETAMINOPHEN 325 MG/1
650 TABLET ORAL EVERY 4 HOURS PRN
Status: CANCELLED | OUTPATIENT
Start: 2019-07-29

## 2019-07-29 RX ORDER — ATORVASTATIN CALCIUM 40 MG/1
80 TABLET, FILM COATED ORAL NIGHTLY
Status: DISCONTINUED | OUTPATIENT
Start: 2019-07-29 | End: 2019-07-30 | Stop reason: HOSPADM

## 2019-07-29 RX ORDER — ONDANSETRON 2 MG/ML
4 INJECTION INTRAMUSCULAR; INTRAVENOUS EVERY 6 HOURS PRN
Status: CANCELLED | OUTPATIENT
Start: 2019-07-29

## 2019-07-29 RX ORDER — LIDOCAINE HYDROCHLORIDE 20 MG/ML
INJECTION, SOLUTION INFILTRATION; PERINEURAL AS NEEDED
Status: DISCONTINUED | OUTPATIENT
Start: 2019-07-29 | End: 2019-07-29 | Stop reason: HOSPADM

## 2019-07-29 RX ORDER — DIPHENHYDRAMINE HCL 25 MG
25 CAPSULE ORAL NIGHTLY PRN
Status: DISCONTINUED | OUTPATIENT
Start: 2019-07-29 | End: 2019-07-30 | Stop reason: HOSPADM

## 2019-07-29 RX ORDER — DIPHENHYDRAMINE HYDROCHLORIDE 50 MG/ML
INJECTION INTRAMUSCULAR; INTRAVENOUS AS NEEDED
Status: DISCONTINUED | OUTPATIENT
Start: 2019-07-29 | End: 2019-07-29 | Stop reason: HOSPADM

## 2019-07-29 RX ORDER — OMEPRAZOLE 20 MG/1
20 CAPSULE, DELAYED RELEASE ORAL DAILY
COMMUNITY

## 2019-07-29 RX ORDER — SODIUM CHLORIDE 0.9 % (FLUSH) 0.9 %
3-10 SYRINGE (ML) INJECTION AS NEEDED
Status: DISCONTINUED | OUTPATIENT
Start: 2019-07-29 | End: 2019-07-30 | Stop reason: HOSPADM

## 2019-07-29 RX ORDER — PANTOPRAZOLE SODIUM 40 MG/1
40 TABLET, DELAYED RELEASE ORAL EVERY MORNING
Status: DISCONTINUED | OUTPATIENT
Start: 2019-07-30 | End: 2019-07-30 | Stop reason: HOSPADM

## 2019-07-29 RX ORDER — ONDANSETRON 2 MG/ML
4 INJECTION INTRAMUSCULAR; INTRAVENOUS EVERY 6 HOURS PRN
Status: DISCONTINUED | OUTPATIENT
Start: 2019-07-29 | End: 2019-07-30 | Stop reason: HOSPADM

## 2019-07-29 RX ORDER — CYCLOBENZAPRINE HCL 10 MG
10 TABLET ORAL 3 TIMES DAILY PRN
Status: DISCONTINUED | OUTPATIENT
Start: 2019-07-29 | End: 2019-07-30 | Stop reason: HOSPADM

## 2019-07-29 RX ORDER — SODIUM CHLORIDE 0.9 % (FLUSH) 0.9 %
3 SYRINGE (ML) INJECTION EVERY 12 HOURS SCHEDULED
Status: CANCELLED | OUTPATIENT
Start: 2019-07-29

## 2019-07-29 RX ORDER — RALOXIFENE HYDROCHLORIDE 60 MG/1
60 TABLET, FILM COATED ORAL DAILY
Status: DISCONTINUED | OUTPATIENT
Start: 2019-07-29 | End: 2019-07-30 | Stop reason: HOSPADM

## 2019-07-29 RX ORDER — FENTANYL CITRATE 50 UG/ML
INJECTION, SOLUTION INTRAMUSCULAR; INTRAVENOUS AS NEEDED
Status: DISCONTINUED | OUTPATIENT
Start: 2019-07-29 | End: 2019-07-29 | Stop reason: HOSPADM

## 2019-07-29 RX ORDER — HYDROCODONE BITARTRATE AND ACETAMINOPHEN 5; 325 MG/1; MG/1
1 TABLET ORAL EVERY 4 HOURS PRN
Status: DISCONTINUED | OUTPATIENT
Start: 2019-07-29 | End: 2019-07-30 | Stop reason: HOSPADM

## 2019-07-29 RX ORDER — CEFAZOLIN SODIUM 1 G/50ML
1 INJECTION, SOLUTION INTRAVENOUS EVERY 8 HOURS
Status: COMPLETED | OUTPATIENT
Start: 2019-07-29 | End: 2019-07-30

## 2019-07-29 RX ORDER — LANOLIN ALCOHOL/MO/W.PET/CERES
400 CREAM (GRAM) TOPICAL DAILY
Status: DISCONTINUED | OUTPATIENT
Start: 2019-07-29 | End: 2019-07-30 | Stop reason: HOSPADM

## 2019-07-29 RX ORDER — SODIUM CHLORIDE 0.9 % (FLUSH) 0.9 %
3-10 SYRINGE (ML) INJECTION AS NEEDED
Status: CANCELLED | OUTPATIENT
Start: 2019-07-29

## 2019-07-29 RX ORDER — HYDROXYCHLOROQUINE SULFATE 200 MG/1
200 TABLET, FILM COATED ORAL
Status: DISCONTINUED | OUTPATIENT
Start: 2019-07-30 | End: 2019-07-30 | Stop reason: HOSPADM

## 2019-07-29 RX ORDER — IBUPROFEN 800 MG/1
400 TABLET ORAL EVERY 6 HOURS PRN
Status: DISCONTINUED | OUTPATIENT
Start: 2019-07-29 | End: 2019-07-30 | Stop reason: HOSPADM

## 2019-07-29 RX ORDER — MIDAZOLAM HYDROCHLORIDE 1 MG/ML
INJECTION INTRAMUSCULAR; INTRAVENOUS AS NEEDED
Status: DISCONTINUED | OUTPATIENT
Start: 2019-07-29 | End: 2019-07-29 | Stop reason: HOSPADM

## 2019-07-29 RX ORDER — ASPIRIN 81 MG/1
81 TABLET, CHEWABLE ORAL DAILY
Status: DISCONTINUED | OUTPATIENT
Start: 2019-07-29 | End: 2019-07-30 | Stop reason: HOSPADM

## 2019-07-29 RX ADMIN — CARBIDOPA AND LEVODOPA 1.5 TABLET: 25; 100 TABLET ORAL at 18:16

## 2019-07-29 RX ADMIN — RALOXIFENE HYDROCHLORIDE 60 MG: 60 TABLET, FILM COATED ORAL at 18:16

## 2019-07-29 RX ADMIN — Medication 1 APPLICATION: at 21:42

## 2019-07-29 RX ADMIN — Medication 400 MCG: at 18:16

## 2019-07-29 RX ADMIN — PREDNISONE 2 MG: 1 TABLET ORAL at 20:26

## 2019-07-29 RX ADMIN — HYDROXYCHLOROQUINE SULFATE 100 MG: 200 TABLET ORAL at 20:25

## 2019-07-29 RX ADMIN — CEFAZOLIN SODIUM 1 G: 1 INJECTION, SOLUTION INTRAVENOUS at 20:56

## 2019-07-29 RX ADMIN — SODIUM CHLORIDE, PRESERVATIVE FREE 3 ML: 5 INJECTION INTRAVENOUS at 20:56

## 2019-07-29 RX ADMIN — ASPIRIN 81 MG 81 MG: 81 TABLET ORAL at 18:16

## 2019-07-29 RX ADMIN — DESMOPRESSIN ACETATE 80 MG: 0.2 TABLET ORAL at 20:26

## 2019-07-30 VITALS
OXYGEN SATURATION: 98 % | HEIGHT: 62 IN | RESPIRATION RATE: 18 BRPM | SYSTOLIC BLOOD PRESSURE: 134 MMHG | HEART RATE: 76 BPM | DIASTOLIC BLOOD PRESSURE: 61 MMHG | BODY MASS INDEX: 21.05 KG/M2 | WEIGHT: 114.38 LBS | TEMPERATURE: 97.2 F

## 2019-07-30 PROCEDURE — 99024 POSTOP FOLLOW-UP VISIT: CPT | Performed by: NURSE PRACTITIONER

## 2019-07-30 PROCEDURE — 63710000001 PREDNISONE PER 5 MG: Performed by: INTERNAL MEDICINE

## 2019-07-30 PROCEDURE — 93010 ELECTROCARDIOGRAM REPORT: CPT | Performed by: INTERNAL MEDICINE

## 2019-07-30 PROCEDURE — 25010000003 CEFAZOLIN 1-4 GM/50ML-% SOLUTION: Performed by: INTERNAL MEDICINE

## 2019-07-30 PROCEDURE — 93005 ELECTROCARDIOGRAM TRACING: CPT | Performed by: INTERNAL MEDICINE

## 2019-07-30 RX ADMIN — CEFAZOLIN SODIUM 1 G: 1 INJECTION, SOLUTION INTRAVENOUS at 04:20

## 2019-07-30 RX ADMIN — Medication 1 APPLICATION: at 08:26

## 2019-07-30 RX ADMIN — PANTOPRAZOLE SODIUM 40 MG: 40 TABLET, DELAYED RELEASE ORAL at 04:56

## 2019-07-30 RX ADMIN — Medication 400 MCG: at 08:26

## 2019-07-30 RX ADMIN — HYDROXYCHLOROQUINE SULFATE 200 MG: 200 TABLET ORAL at 08:28

## 2019-07-30 RX ADMIN — CARBIDOPA AND LEVODOPA 1.5 TABLET: 25; 100 TABLET ORAL at 08:27

## 2019-07-30 RX ADMIN — ASPIRIN 81 MG 81 MG: 81 TABLET ORAL at 08:26

## 2019-07-30 RX ADMIN — RALOXIFENE HYDROCHLORIDE 60 MG: 60 TABLET, FILM COATED ORAL at 08:26

## 2019-07-30 RX ADMIN — PREDNISONE 2 MG: 1 TABLET ORAL at 08:26

## 2019-07-30 RX ADMIN — SODIUM CHLORIDE, PRESERVATIVE FREE 3 ML: 5 INJECTION INTRAVENOUS at 08:27

## 2019-08-06 ENCOUNTER — OUTSIDE FACILITY SERVICE (OUTPATIENT)
Dept: CARDIOLOGY | Facility: CLINIC | Age: 74
End: 2019-08-06

## 2019-08-06 PROCEDURE — 99215 OFFICE O/P EST HI 40 MIN: CPT | Performed by: NURSE PRACTITIONER

## 2019-08-07 PROCEDURE — 99213 OFFICE O/P EST LOW 20 MIN: CPT | Performed by: NURSE PRACTITIONER

## 2019-08-08 ENCOUNTER — TELEPHONE (OUTPATIENT)
Dept: CARDIOLOGY | Facility: CLINIC | Age: 74
End: 2019-08-08

## 2019-08-08 NOTE — TELEPHONE ENCOUNTER
Patients sister has called in today for her about some questions that the patient has.    She is wondering how long until she can drive after pacemaker implant?    If she has to ride in ambulance again, will any of the wires interfere with pacemaker?

## 2019-08-31 ENCOUNTER — HOSPITAL ENCOUNTER (INPATIENT)
Facility: HOSPITAL | Age: 74
LOS: 2 days | Discharge: HOME | End: 2019-09-02
Attending: EMERGENCY MEDICINE | Admitting: INTERNAL MEDICINE

## 2019-08-31 DIAGNOSIS — R13.12 OROPHARYNGEAL DYSPHAGIA: ICD-10-CM

## 2019-08-31 DIAGNOSIS — I21.4 NON-STEMI (NON-ST ELEVATED MYOCARDIAL INFARCTION) (HCC): Primary | ICD-10-CM

## 2019-08-31 PROBLEM — K21.9 GERD (GASTROESOPHAGEAL REFLUX DISEASE): Status: ACTIVE | Noted: 2019-08-31

## 2019-08-31 PROBLEM — G20 PARKINSON'S DISEASE (HCC): Status: ACTIVE | Noted: 2019-08-31

## 2019-08-31 PROBLEM — J44.9 COPD (CHRONIC OBSTRUCTIVE PULMONARY DISEASE) (HCC): Status: ACTIVE | Noted: 2019-08-31

## 2019-08-31 PROBLEM — M06.9 RHEUMATOID ARTHRITIS (HCC): Status: ACTIVE | Noted: 2019-08-31

## 2019-08-31 LAB
ALBUMIN SERPL-MCNC: 3.4 G/DL (ref 3.5–5)
ALBUMIN/GLOB SERPL: 1.3 G/DL (ref 1.1–2.5)
ALP SERPL-CCNC: 193 U/L (ref 24–120)
ALT SERPL W P-5'-P-CCNC: 100 U/L (ref 0–54)
ANION GAP SERPL CALCULATED.3IONS-SCNC: 6 MMOL/L (ref 4–13)
AST SERPL-CCNC: 82 U/L (ref 7–45)
BASOPHILS # BLD AUTO: 0.02 10*3/MM3 (ref 0–0.2)
BASOPHILS NFR BLD AUTO: 0.3 % (ref 0–1.5)
BILIRUB SERPL-MCNC: 0.5 MG/DL (ref 0.1–1)
BUN BLD-MCNC: 17 MG/DL (ref 5–21)
BUN/CREAT SERPL: 27.9 (ref 7–25)
CALCIUM SPEC-SCNC: 8.7 MG/DL (ref 8.4–10.4)
CHLORIDE SERPL-SCNC: 106 MMOL/L (ref 98–110)
CO2 SERPL-SCNC: 28 MMOL/L (ref 24–31)
CREAT BLD-MCNC: 0.61 MG/DL (ref 0.5–1.4)
DEPRECATED RDW RBC AUTO: 44.1 FL (ref 37–54)
EOSINOPHIL # BLD AUTO: 0.38 10*3/MM3 (ref 0–0.4)
EOSINOPHIL NFR BLD AUTO: 5.5 % (ref 0.3–6.2)
ERYTHROCYTE [DISTWIDTH] IN BLOOD BY AUTOMATED COUNT: 12.4 % (ref 12.3–15.4)
GFR SERPL CREATININE-BSD FRML MDRD: 95 ML/MIN/1.73
GLOBULIN UR ELPH-MCNC: 2.6 GM/DL
GLUCOSE BLD-MCNC: 92 MG/DL (ref 70–100)
HCT VFR BLD AUTO: 36.9 % (ref 34–46.6)
HGB BLD-MCNC: 12.8 G/DL (ref 12–15.9)
HOLD SPECIMEN: NORMAL
HOLD SPECIMEN: NORMAL
IMM GRANULOCYTES # BLD AUTO: 0.02 10*3/MM3 (ref 0–0.05)
IMM GRANULOCYTES NFR BLD AUTO: 0.3 % (ref 0–0.5)
LIPASE SERPL-CCNC: 120 U/L (ref 23–203)
LYMPHOCYTES # BLD AUTO: 1.36 10*3/MM3 (ref 0.7–3.1)
LYMPHOCYTES NFR BLD AUTO: 19.8 % (ref 19.6–45.3)
MCH RBC QN AUTO: 33.7 PG (ref 26.6–33)
MCHC RBC AUTO-ENTMCNC: 34.7 G/DL (ref 31.5–35.7)
MCV RBC AUTO: 97.1 FL (ref 79–97)
MONOCYTES # BLD AUTO: 0.93 10*3/MM3 (ref 0.1–0.9)
MONOCYTES NFR BLD AUTO: 13.5 % (ref 5–12)
NEUTROPHILS # BLD AUTO: 4.16 10*3/MM3 (ref 1.7–7)
NEUTROPHILS NFR BLD AUTO: 60.6 % (ref 42.7–76)
NRBC BLD AUTO-RTO: 0 /100 WBC (ref 0–0.2)
PLATELET # BLD AUTO: 135 10*3/MM3 (ref 140–450)
PMV BLD AUTO: 9.4 FL (ref 6–12)
POTASSIUM BLD-SCNC: 4.5 MMOL/L (ref 3.5–5.3)
PROT SERPL-MCNC: 6 G/DL (ref 6.3–8.7)
RBC # BLD AUTO: 3.8 10*6/MM3 (ref 3.77–5.28)
SODIUM BLD-SCNC: 140 MMOL/L (ref 135–145)
TROPONIN I SERPL-MCNC: 0.14 NG/ML (ref 0–0.03)
TROPONIN I SERPL-MCNC: 0.17 NG/ML (ref 0–0.03)
WBC NRBC COR # BLD: 6.87 10*3/MM3 (ref 3.4–10.8)
WHOLE BLOOD HOLD SPECIMEN: NORMAL
WHOLE BLOOD HOLD SPECIMEN: NORMAL

## 2019-08-31 PROCEDURE — 25010000002 ENOXAPARIN PER 10 MG: Performed by: INTERNAL MEDICINE

## 2019-08-31 PROCEDURE — 63710000001 PREDNISONE PER 5 MG: Performed by: INTERNAL MEDICINE

## 2019-08-31 PROCEDURE — 63710000001 METHOTREXATE PER 2.5 MG: Performed by: INTERNAL MEDICINE

## 2019-08-31 PROCEDURE — 94760 N-INVAS EAR/PLS OXIMETRY 1: CPT

## 2019-08-31 PROCEDURE — 84484 ASSAY OF TROPONIN QUANT: CPT | Performed by: INTERNAL MEDICINE

## 2019-08-31 PROCEDURE — 93010 ELECTROCARDIOGRAM REPORT: CPT | Performed by: INTERNAL MEDICINE

## 2019-08-31 PROCEDURE — 93005 ELECTROCARDIOGRAM TRACING: CPT | Performed by: EMERGENCY MEDICINE

## 2019-08-31 PROCEDURE — 80053 COMPREHEN METABOLIC PANEL: CPT | Performed by: EMERGENCY MEDICINE

## 2019-08-31 PROCEDURE — 84484 ASSAY OF TROPONIN QUANT: CPT | Performed by: EMERGENCY MEDICINE

## 2019-08-31 PROCEDURE — 93005 ELECTROCARDIOGRAM TRACING: CPT | Performed by: INTERNAL MEDICINE

## 2019-08-31 PROCEDURE — 83690 ASSAY OF LIPASE: CPT | Performed by: EMERGENCY MEDICINE

## 2019-08-31 PROCEDURE — 85025 COMPLETE CBC W/AUTO DIFF WBC: CPT | Performed by: EMERGENCY MEDICINE

## 2019-08-31 PROCEDURE — 99284 EMERGENCY DEPT VISIT MOD MDM: CPT

## 2019-08-31 PROCEDURE — 99223 1ST HOSP IP/OBS HIGH 75: CPT | Performed by: INTERNAL MEDICINE

## 2019-08-31 PROCEDURE — 94799 UNLISTED PULMONARY SVC/PX: CPT

## 2019-08-31 RX ORDER — RALOXIFENE HYDROCHLORIDE 60 MG/1
60 TABLET, FILM COATED ORAL DAILY
Status: DISCONTINUED | OUTPATIENT
Start: 2019-09-01 | End: 2019-09-02 | Stop reason: HOSPADM

## 2019-08-31 RX ORDER — SODIUM CHLORIDE 0.9 % (FLUSH) 0.9 %
10 SYRINGE (ML) INJECTION EVERY 12 HOURS SCHEDULED
Status: DISCONTINUED | OUTPATIENT
Start: 2019-08-31 | End: 2019-09-02 | Stop reason: HOSPADM

## 2019-08-31 RX ORDER — ATORVASTATIN CALCIUM 40 MG/1
40 TABLET, FILM COATED ORAL NIGHTLY
Status: DISCONTINUED | OUTPATIENT
Start: 2019-08-31 | End: 2019-09-02 | Stop reason: HOSPADM

## 2019-08-31 RX ORDER — PANTOPRAZOLE SODIUM 40 MG/1
40 TABLET, DELAYED RELEASE ORAL
Status: DISCONTINUED | OUTPATIENT
Start: 2019-09-01 | End: 2019-09-02 | Stop reason: HOSPADM

## 2019-08-31 RX ORDER — HYDROXYCHLOROQUINE SULFATE 200 MG/1
200 TABLET, FILM COATED ORAL
Status: DISCONTINUED | OUTPATIENT
Start: 2019-09-01 | End: 2019-09-02 | Stop reason: HOSPADM

## 2019-08-31 RX ORDER — PREDNISONE 1 MG/1
2 TABLET ORAL 2 TIMES DAILY
Status: DISCONTINUED | OUTPATIENT
Start: 2019-08-31 | End: 2019-09-02 | Stop reason: HOSPADM

## 2019-08-31 RX ORDER — NITROGLYCERIN 20 MG/100ML
5-200 INJECTION INTRAVENOUS
Status: DISCONTINUED | OUTPATIENT
Start: 2019-08-31 | End: 2019-08-31 | Stop reason: SDUPTHER

## 2019-08-31 RX ORDER — LANOLIN ALCOHOL/MO/W.PET/CERES
400 CREAM (GRAM) TOPICAL DAILY
Status: DISCONTINUED | OUTPATIENT
Start: 2019-09-01 | End: 2019-09-02 | Stop reason: HOSPADM

## 2019-08-31 RX ORDER — ASPIRIN 81 MG/1
81 TABLET ORAL DAILY
Status: DISCONTINUED | OUTPATIENT
Start: 2019-09-01 | End: 2019-09-02 | Stop reason: HOSPADM

## 2019-08-31 RX ORDER — HYDROXYCHLOROQUINE SULFATE 200 MG/1
100 TABLET, FILM COATED ORAL NIGHTLY
Status: DISCONTINUED | OUTPATIENT
Start: 2019-08-31 | End: 2019-09-02 | Stop reason: HOSPADM

## 2019-08-31 RX ORDER — ASPIRIN 81 MG/1
324 TABLET, CHEWABLE ORAL ONCE
Status: DISCONTINUED | OUTPATIENT
Start: 2019-08-31 | End: 2019-09-02 | Stop reason: HOSPADM

## 2019-08-31 RX ORDER — SODIUM CHLORIDE 0.9 % (FLUSH) 0.9 %
10 SYRINGE (ML) INJECTION AS NEEDED
Status: DISCONTINUED | OUTPATIENT
Start: 2019-08-31 | End: 2019-09-02 | Stop reason: HOSPADM

## 2019-08-31 RX ORDER — NITROGLYCERIN 20 MG/100ML
10 INJECTION INTRAVENOUS CONTINUOUS
Status: DISCONTINUED | OUTPATIENT
Start: 2019-08-31 | End: 2019-09-02 | Stop reason: HOSPADM

## 2019-08-31 RX ADMIN — SODIUM CHLORIDE, PRESERVATIVE FREE 10 ML: 5 INJECTION INTRAVENOUS at 20:16

## 2019-08-31 RX ADMIN — SODIUM CHLORIDE, PRESERVATIVE FREE 10 ML: 5 INJECTION INTRAVENOUS at 20:20

## 2019-08-31 RX ADMIN — NITROGLYCERIN 5 MCG/MIN: 20 INJECTION INTRAVENOUS at 15:51

## 2019-08-31 RX ADMIN — METHOTREXATE SODIUM 10 MG: 2.5 TABLET ORAL at 20:15

## 2019-08-31 RX ADMIN — PREDNISONE 2 MG: 1 TABLET ORAL at 20:14

## 2019-08-31 RX ADMIN — HYDROXYCHLOROQUINE SULFATE 100 MG: 200 TABLET ORAL at 20:13

## 2019-08-31 RX ADMIN — ATORVASTATIN CALCIUM 40 MG: 40 TABLET, FILM COATED ORAL at 20:15

## 2019-08-31 RX ADMIN — ENOXAPARIN SODIUM 50 MG: 60 INJECTION SUBCUTANEOUS at 17:40

## 2019-09-01 ENCOUNTER — APPOINTMENT (OUTPATIENT)
Dept: ULTRASOUND IMAGING | Facility: HOSPITAL | Age: 74
End: 2019-09-01

## 2019-09-01 ENCOUNTER — APPOINTMENT (OUTPATIENT)
Dept: CARDIOLOGY | Facility: HOSPITAL | Age: 74
End: 2019-09-01

## 2019-09-01 PROBLEM — J44.9 COPD (CHRONIC OBSTRUCTIVE PULMONARY DISEASE) (HCC): Chronic | Status: ACTIVE | Noted: 2019-08-31

## 2019-09-01 PROBLEM — R77.8 ELEVATED TROPONIN: Status: ACTIVE | Noted: 2019-08-31

## 2019-09-01 PROBLEM — G20 PARKINSON'S DISEASE (HCC): Chronic | Status: ACTIVE | Noted: 2019-08-31

## 2019-09-01 PROBLEM — K21.9 GASTROESOPHAGEAL REFLUX DISEASE WITHOUT ESOPHAGITIS: Chronic | Status: ACTIVE | Noted: 2019-08-31

## 2019-09-01 PROBLEM — M06.9 RHEUMATOID ARTHRITIS (HCC): Chronic | Status: ACTIVE | Noted: 2019-08-31

## 2019-09-01 LAB
ANION GAP SERPL CALCULATED.3IONS-SCNC: 5 MMOL/L (ref 4–13)
ARTICHOKE IGE QN: 43 MG/DL (ref 0–99)
BH CV ECHO MEAS - AO MAX PG (FULL): 3.1 MMHG
BH CV ECHO MEAS - AO MAX PG: 9.1 MMHG
BH CV ECHO MEAS - AO MEAN PG (FULL): 3 MMHG
BH CV ECHO MEAS - AO MEAN PG: 6 MMHG
BH CV ECHO MEAS - AO ROOT AREA (BSA CORRECTED): 2.4
BH CV ECHO MEAS - AO ROOT AREA: 9.6 CM^2
BH CV ECHO MEAS - AO ROOT DIAM: 3.5 CM
BH CV ECHO MEAS - AO V2 MAX: 151 CM/SEC
BH CV ECHO MEAS - AO V2 MEAN: 118 CM/SEC
BH CV ECHO MEAS - AO V2 VTI: 30.2 CM
BH CV ECHO MEAS - AVA(I,A): 3 CM^2
BH CV ECHO MEAS - AVA(I,D): 3 CM^2
BH CV ECHO MEAS - AVA(V,A): 2.6 CM^2
BH CV ECHO MEAS - AVA(V,D): 2.6 CM^2
BH CV ECHO MEAS - BSA(HAYCOCK): 1.4 M^2
BH CV ECHO MEAS - BSA: 1.4 M^2
BH CV ECHO MEAS - BZI_BMI: 19 KILOGRAMS/M^2
BH CV ECHO MEAS - BZI_METRIC_HEIGHT: 157.5 CM
BH CV ECHO MEAS - BZI_METRIC_WEIGHT: 47.2 KG
BH CV ECHO MEAS - EDV(CUBED): 67.9 ML
BH CV ECHO MEAS - EDV(MOD-SP4): 71.2 ML
BH CV ECHO MEAS - EDV(TEICH): 73.4 ML
BH CV ECHO MEAS - EF(CUBED): 69.4 %
BH CV ECHO MEAS - EF(MOD-SP4): 64.3 %
BH CV ECHO MEAS - EF(TEICH): 61.5 %
BH CV ECHO MEAS - ESV(CUBED): 20.8 ML
BH CV ECHO MEAS - ESV(MOD-SP4): 25.4 ML
BH CV ECHO MEAS - ESV(TEICH): 28.3 ML
BH CV ECHO MEAS - FS: 32.6 %
BH CV ECHO MEAS - IVS/LVPW: 0.89
BH CV ECHO MEAS - IVSD: 1.3 CM
BH CV ECHO MEAS - LA DIMENSION: 3.8 CM
BH CV ECHO MEAS - LA/AO: 1.1
BH CV ECHO MEAS - LAT PEAK E' VEL: 5.3 CM/SEC
BH CV ECHO MEAS - LV DIASTOLIC VOL/BSA (35-75): 49.2 ML/M^2
BH CV ECHO MEAS - LV MASS(C)D: 203.4 GRAMS
BH CV ECHO MEAS - LV MASS(C)DI: 140.5 GRAMS/M^2
BH CV ECHO MEAS - LV MAX PG: 6.1 MMHG
BH CV ECHO MEAS - LV MEAN PG: 3 MMHG
BH CV ECHO MEAS - LV SYSTOLIC VOL/BSA (12-30): 17.5 ML/M^2
BH CV ECHO MEAS - LV V1 MAX: 123 CM/SEC
BH CV ECHO MEAS - LV V1 MEAN: 83.3 CM/SEC
BH CV ECHO MEAS - LV V1 VTI: 29 CM
BH CV ECHO MEAS - LVIDD: 4.1 CM
BH CV ECHO MEAS - LVIDS: 2.8 CM
BH CV ECHO MEAS - LVLD AP4: 6.8 CM
BH CV ECHO MEAS - LVLS AP4: 6 CM
BH CV ECHO MEAS - LVOT AREA (M): 3.1 CM^2
BH CV ECHO MEAS - LVOT AREA: 3.1 CM^2
BH CV ECHO MEAS - LVOT DIAM: 2 CM
BH CV ECHO MEAS - LVPWD: 1.4 CM
BH CV ECHO MEAS - MED PEAK E' VEL: 4.1 CM/SEC
BH CV ECHO MEAS - MV A MAX VEL: 80.5 CM/SEC
BH CV ECHO MEAS - MV DEC SLOPE: 253 CM/SEC^2
BH CV ECHO MEAS - MV DEC TIME: 0.22 SEC
BH CV ECHO MEAS - MV E MAX VEL: 55.3 CM/SEC
BH CV ECHO MEAS - MV E/A: 0.69
BH CV ECHO MEAS - RAP SYSTOLE: 10 MMHG
BH CV ECHO MEAS - RVSP: 37.2 MMHG
BH CV ECHO MEAS - SI(AO): 200.7 ML/M^2
BH CV ECHO MEAS - SI(CUBED): 32.5 ML/M^2
BH CV ECHO MEAS - SI(LVOT): 62.9 ML/M^2
BH CV ECHO MEAS - SI(MOD-SP4): 31.6 ML/M^2
BH CV ECHO MEAS - SI(TEICH): 31.2 ML/M^2
BH CV ECHO MEAS - SV(AO): 290.6 ML
BH CV ECHO MEAS - SV(CUBED): 47.1 ML
BH CV ECHO MEAS - SV(LVOT): 91.1 ML
BH CV ECHO MEAS - SV(MOD-SP4): 45.8 ML
BH CV ECHO MEAS - SV(TEICH): 45.1 ML
BH CV ECHO MEAS - TR MAX VEL: 261 CM/SEC
BH CV ECHO MEASUREMENTS AVERAGE E/E' RATIO: 11.77
BUN BLD-MCNC: 14 MG/DL (ref 5–21)
BUN/CREAT SERPL: 21.5 (ref 7–25)
CALCIUM SPEC-SCNC: 8.9 MG/DL (ref 8.4–10.4)
CHLORIDE SERPL-SCNC: 105 MMOL/L (ref 98–110)
CHOLEST SERPL-MCNC: 79 MG/DL (ref 130–200)
CO2 SERPL-SCNC: 29 MMOL/L (ref 24–31)
CREAT BLD-MCNC: 0.65 MG/DL (ref 0.5–1.4)
DEPRECATED RDW RBC AUTO: 46.1 FL (ref 37–54)
ERYTHROCYTE [DISTWIDTH] IN BLOOD BY AUTOMATED COUNT: 12.5 % (ref 12.3–15.4)
GFR SERPL CREATININE-BSD FRML MDRD: 89 ML/MIN/1.73
GLUCOSE BLD-MCNC: 110 MG/DL (ref 70–100)
HBA1C MFR BLD: 5.4 % (ref 4.8–5.9)
HCT VFR BLD AUTO: 35.8 % (ref 34–46.6)
HDLC SERPL-MCNC: 37 MG/DL
HGB BLD-MCNC: 12.3 G/DL (ref 12–15.9)
LDLC/HDLC SERPL: 0.79 {RATIO}
LEFT ATRIUM VOLUME INDEX: 37.5 ML/M2
LEFT ATRIUM VOLUME: 59.9 CM3
MCH RBC QN AUTO: 34.1 PG (ref 26.6–33)
MCHC RBC AUTO-ENTMCNC: 34.4 G/DL (ref 31.5–35.7)
MCV RBC AUTO: 99.2 FL (ref 79–97)
PLATELET # BLD AUTO: 144 10*3/MM3 (ref 140–450)
PMV BLD AUTO: 10.4 FL (ref 6–12)
POTASSIUM BLD-SCNC: 4.3 MMOL/L (ref 3.5–5.3)
RBC # BLD AUTO: 3.61 10*6/MM3 (ref 3.77–5.28)
SODIUM BLD-SCNC: 139 MMOL/L (ref 135–145)
TRIGL SERPL-MCNC: 63 MG/DL (ref 0–149)
TROPONIN I SERPL-MCNC: 0.1 NG/ML (ref 0–0.03)
TROPONIN I SERPL-MCNC: 0.17 NG/ML (ref 0–0.03)
WBC NRBC COR # BLD: 8.44 10*3/MM3 (ref 3.4–10.8)

## 2019-09-01 PROCEDURE — 83036 HEMOGLOBIN GLYCOSYLATED A1C: CPT | Performed by: INTERNAL MEDICINE

## 2019-09-01 PROCEDURE — 80048 BASIC METABOLIC PNL TOTAL CA: CPT | Performed by: INTERNAL MEDICINE

## 2019-09-01 PROCEDURE — 80061 LIPID PANEL: CPT | Performed by: INTERNAL MEDICINE

## 2019-09-01 PROCEDURE — 92610 EVALUATE SWALLOWING FUNCTION: CPT | Performed by: SPEECH-LANGUAGE PATHOLOGIST

## 2019-09-01 PROCEDURE — 25010000002 ENOXAPARIN PER 10 MG: Performed by: INTERNAL MEDICINE

## 2019-09-01 PROCEDURE — 84484 ASSAY OF TROPONIN QUANT: CPT | Performed by: INTERNAL MEDICINE

## 2019-09-01 PROCEDURE — 93010 ELECTROCARDIOGRAM REPORT: CPT | Performed by: INTERNAL MEDICINE

## 2019-09-01 PROCEDURE — 76705 ECHO EXAM OF ABDOMEN: CPT

## 2019-09-01 PROCEDURE — 97165 OT EVAL LOW COMPLEX 30 MIN: CPT

## 2019-09-01 PROCEDURE — 94799 UNLISTED PULMONARY SVC/PX: CPT

## 2019-09-01 PROCEDURE — 99232 SBSQ HOSP IP/OBS MODERATE 35: CPT | Performed by: INTERNAL MEDICINE

## 2019-09-01 PROCEDURE — 93306 TTE W/DOPPLER COMPLETE: CPT | Performed by: INTERNAL MEDICINE

## 2019-09-01 PROCEDURE — 63710000001 PREDNISONE PER 5 MG: Performed by: INTERNAL MEDICINE

## 2019-09-01 PROCEDURE — 85027 COMPLETE CBC AUTOMATED: CPT | Performed by: INTERNAL MEDICINE

## 2019-09-01 PROCEDURE — 94760 N-INVAS EAR/PLS OXIMETRY 1: CPT

## 2019-09-01 PROCEDURE — 93306 TTE W/DOPPLER COMPLETE: CPT

## 2019-09-01 RX ORDER — ACETAMINOPHEN 325 MG/1
650 TABLET ORAL EVERY 6 HOURS PRN
Status: DISCONTINUED | OUTPATIENT
Start: 2019-09-01 | End: 2019-09-02 | Stop reason: HOSPADM

## 2019-09-01 RX ADMIN — PREDNISONE 2 MG: 1 TABLET ORAL at 20:53

## 2019-09-01 RX ADMIN — NITROGLYCERIN 5 MCG/MIN: 20 INJECTION INTRAVENOUS at 08:05

## 2019-09-01 RX ADMIN — CARBIDOPA AND LEVODOPA 1.5 TABLET: 25; 100 TABLET ORAL at 17:05

## 2019-09-01 RX ADMIN — HYDROXYCHLOROQUINE SULFATE 100 MG: 200 TABLET ORAL at 20:53

## 2019-09-01 RX ADMIN — ATORVASTATIN CALCIUM 40 MG: 40 TABLET, FILM COATED ORAL at 20:53

## 2019-09-01 RX ADMIN — ENOXAPARIN SODIUM 50 MG: 60 INJECTION SUBCUTANEOUS at 05:54

## 2019-09-01 RX ADMIN — ENOXAPARIN SODIUM 50 MG: 60 INJECTION SUBCUTANEOUS at 17:05

## 2019-09-01 RX ADMIN — PREDNISONE 2 MG: 1 TABLET ORAL at 08:10

## 2019-09-01 RX ADMIN — Medication 400 MCG: at 08:10

## 2019-09-01 RX ADMIN — ASPIRIN 81 MG: 81 TABLET ORAL at 08:10

## 2019-09-01 RX ADMIN — HYDROXYCHLOROQUINE SULFATE 200 MG: 200 TABLET ORAL at 08:11

## 2019-09-01 RX ADMIN — CARBIDOPA AND LEVODOPA 1.5 TABLET: 25; 100 TABLET ORAL at 08:10

## 2019-09-01 RX ADMIN — ACETAMINOPHEN 650 MG: 325 TABLET, FILM COATED ORAL at 08:10

## 2019-09-01 RX ADMIN — RALOXIFENE HYDROCHLORIDE 60 MG: 60 TABLET, FILM COATED ORAL at 08:10

## 2019-09-01 RX ADMIN — PANTOPRAZOLE SODIUM 40 MG: 40 TABLET, DELAYED RELEASE ORAL at 05:54

## 2019-09-01 RX ADMIN — SODIUM CHLORIDE, PRESERVATIVE FREE 10 ML: 5 INJECTION INTRAVENOUS at 20:54

## 2019-09-01 RX ADMIN — SODIUM CHLORIDE, PRESERVATIVE FREE 10 ML: 5 INJECTION INTRAVENOUS at 08:11

## 2019-09-01 NOTE — THERAPY EVALUATION
Acute Care - Speech Language Pathology   Swallow Initial Evaluation [facility]     Patient Name: Kristen Mittal  : 1945  MRN: [id number]  Today's Date: 2019  Onset of Illness/Injury or Date of Surgery: 19     Referring Physician: Dr. Lopes      Admit Date: 2019    Bedside Swallow Evaluation completed.  Patient with hx of Parkinson's Disease per chart, however, speech not indicative of dysarthria associated with parkinson's.  Patient with very choppy speech and inability to coordinate articulation, voicing, and breath support in order to maintain fluent speech pattern.  Intentional tremor also observed more than resting tremor.  This caused difficulty with taking PO via spoon.  However, patient was able to take all consistencies without any s/s of aspiration.  She reports soft foods at home and difficulty with pills.  Did discuss avoiding mixed consistencies due to tremors and potential of difficulty controlling in oral cavity.    Rec:   [facility] with [facility]   2) Meds whole in applesauce   3) ST to follow for compensatory strategy training   4) Neuro consult if MD feels warranted due to SLP concerns with dx of Parkinson's.    Kristin Deras, MS CCC-SLP 2019 3:16 PM    Visit Dx:     ICD-10-CM ICD-9-CM   1. Non-STEMI (non-ST elevated myocardial infarction) (CMS/HCC) I21.4 410.70   2. Oropharyngeal dysphagia R13.12 787.22     Patient Active Problem List   Diagnosis   • Second degree AV block, Mobitz type II   • Symptomatic bradycardia   • Heart block   • Elevated troponin   • Parkinson's disease (CMS/HCC)   • COPD (chronic obstructive pulmonary disease) (CMS/HCC)   • Gastroesophageal reflux disease without esophagitis   • Rheumatoid arthritis (CMS/[facility])     Past Medical History:   Diagnosis Date   • COPD (chronic obstructive pulmonary disease) (CMS/HCC)    • GERD (gastroesophageal reflux disease)    • Osteoporosis    • Parkinson's disease (CMS/[facility])    •  "Rheumatoid arthritis (CMS/[facility])    • Speech abnormality     Pt states \"they dont know why my speech is like this but it has been that way for a long time.\"   • Stroke (CMS/[facility])     Pt states she has not had a stroke and doesnt know why its listed.     Past Surgical History:   Procedure Laterality Date   • CARDIAC ELECTROPHYSIOLOGY PROCEDURE N/A 7/29/2019    Procedure: Pacemaker SC new;  Surgeon: Christiano Jordan MD;  Location:  [facility] CATH [facility];  Service: Cardiology        SWALLOW EVALUATION (last 72 hours)      SLP Adult Swallow Evaluation     Row Name 09/01/19 1425                   Rehab Evaluation    Document Type  evaluation  -KW        Subjective Information  no complaints  -KW        Patient Observations  alert;cooperative  -KW        Patient Effort  good  -KW           General Information    Patient Profile Reviewed  yes  -KW        Pertinent History Of Current Problem  PD, complains of some choking but not consistently, more so with pills.  -KW        Current Method of Nutrition  regular textures;soft textures;NPO due to testing  -KW        Precautions/Limitations, Vision  WFL  -KW        Precautions/Limitations, Hearing  WFL  -KW        Prior Level of Function-Communication  motor speech impairment  -KW        Prior Level of Function-Swallowing  mechanical soft textures  -KW        Plans/Goals Discussed with  patient and family  -KW        Barriers to Rehab  none identified  -KW        Patient's Goals for Discharge  eat/drink without coughing/choking  -KW           Pain Assessment    Additional Documentation  Pain Scale: FACES Pre/Post-Treatment (Group)  -KW           Pain Scale: FACES Pre/Post-Treatment    Pain: FACES Scale, Pretreatment  0-->no hurt  -KW        Pain: FACES Scale, Post-Treatment  0-->no hurt  -KW           Oral Motor and Function    Dentition Assessment  upper dentures/partial in place;lower dentures/partial in place  -KW        Secretion Management  WNL/WFL  -KW        " Mucosal Quality  moist, healthy  -KW           Oral Musculature and Cranial Nerve Assessment    Oral Motor General Assessment  generalized oral motor weakness;vocal impairment;velar impairment;lingual impairment;oral labial or buccal impairment;mandibular impairment  -KW        Mandibular Impairment Detail, Cranial Nerve V (Trigeminal)  bilaterally  -KW        Oral Labial or Buccal Impairment, Detail, Cranial Nerve VII (Facial):  reduced strength bilaterally;reduced ROM  -KW        Lingual Impairment, Detail. Cranial Nerves IX, XII (Glossopharyngeal and Hypoglossal)  reduced lingual ROM;reduced strength  -KW        Velar Impairment, Detail, Cranial Nerves X, XI (Vagus and Accessory)  reduced velar strength  -KW        Vocal Impairment, Detail. Cranial Nerve X (Vagus)  vocal quality abnormality (see comments)  -KW        Oral Motor, Comment  see bedside summary  -KW           General Eating/Swallowing Observations    Respiratory Support Currently in Use  room air  -KW        Eating/Swallowing Skills  self-fed  -KW        Positioning During Eating  upright 90 degree side of bed  -KW        Utensils Used  spoon;straw  -KW        Consistencies Trialed  regular textures;thin liquids;nectar/syrup-thick liquids;honey-thick liquids;pudding thick  -KW           Clinical Swallow Eval    Oral Prep Phase  impaired  -KW        Oral Transit  WFL  -KW        Oral Residue  WFL  -KW        Pharyngeal Phase  no overt signs/symptoms of pharyngeal impairment  -KW        Esophageal Phase  unremarkable  -KW        Clinical Swallow Evaluation Summary  Bedside Swallow Evaluation completed.  Patient with hx of Parkinson's Disease per chart, however, speech not indicative of dysarthria associated with parkinson's.  Patient with very choppy speech and inability to coordinate articulation, voicing, and breath support in order to maintain fluent speech pattern.  Intentional tremor also observed more than resting tremor.  This caused difficulty  with taking PO via spoon.  However, patient was able to take all consistencies without any s/s of aspiration.  She reports soft foods at home and difficulty with pills.  Did discuss avoiding mixed consistencies due to tremors and potential of difficulty controlling in oral cavity.  Rec: 1) Mech soft diet no mixed consistencies with thin liquids 2) Meds whole in applesauce 3) ST to follow for compensatory strategy training 4) Neuro consult if MD feels warranted due to SLP concerns with dx of Parkinson's.    -KW           Oral Prep Concerns    Oral Prep Concerns  inefficient mastication;incomplete or weak lip closure around spoon  -KW        Inefficient Mastication  regular consistencies  -KW        Incomplete or Weak Lip Closure Around Spoon  thin;nectar;honey;pudding;regular consistencies  -KW           Clinical Impression    SLP Swallowing Diagnosis  mild;oral dysfunction  -KW        Functional Impact  risk of aspiration/pneumonia  -KW        Rehab Potential/Prognosis, Swallowing  good, to achieve stated therapy goals  -KW        Swallow Criteria for Skilled Therapeutic Interventions Met  demonstrates skilled criteria  -KW           Recommendations    Therapy Frequency (Swallow)  PRN  -KW        Predicted Duration Therapy Intervention (Days)  until discharge  -KW        SLP Diet Recommendation  mechanical soft with no mixed consistencies;thin liquids  -KW        Recommended Precautions and Strategies  upright posture during/after eating;small bites of food and sips of liquid;alternate between small bites of food and sips of liquid  -KW        SLP Rec. for Method of Medication Administration  meds whole;with pudding or applesauce  -KW        Monitor for Signs of Aspiration  notify SLP if any concerns  -KW        Anticipated Dischage Disposition  home  -KW           Swallow Goals (SLP)    Oral Nutrition/Hydration Goal Selection (SLP)  oral nutrition/hydration, SLP goal 1  -KW        Swallow Compensatory Strategies  Goal Selection (SLP)  swallow compensatory strategies, SLP goal 1  -KW        Additional Documentation  swallow compensatory strategies goal selection (SLP)  -KW           Oral Nutrition/Hydration Goal 1 (SLP)    Oral Nutrition/Hydration Goal 1, SLP  LTG: Patient will tolerate LRD with no overt s/s of aspiration.  -KW        Time Frame (Oral Nutrition/Hydration Goal 1, SLP)  by discharge  -KW        Barriers (Oral Nutrition/Hydration Goal 1, SLP)  n/a  -KW        Progress/Outcomes (Oral Nutrition/Hydration Goal 1, SLP)  goal ongoing  -KW           Swallow Compensatory Strategies Goal 1 (SLP)    Activity (Swallow Compensatory Strategies/Techniques Goal 1, SLP)  compensatory strategies;small bites;small straw sips;alternate food/liquid intake;other (see comments) clear oral prior to additional bites.  -KW        [place]/Accuracy (Swallow Compensatory Strategies/Techniques Goal 1, SLP)  independently (over 90% accuracy)  -KW        Time Frame (Swallow Compensatory Strategies/Techniques Goal 1, SLP)  short term goal (STG);by discharge  -KW        Barriers (Swallow Compensatory Strategies/Techniques Goal 1, SLP)  n/a  -KW        Progress/Outcomes (Swallow Compensatory Strategies/Techniques Goal 1, SLP)  goal ongoing  -KW          User Key  (r) = Recorded By, (t) = Taken By, (c) = Cosigned By    Initials Name Effective Dates    Kristin Nair MS [facility]-SLP 08/02/16 -           EDUCATION  The patient has been educated in the following areas:   Dysphagia (Swallowing Impairment).    SLP Recommendation and Plan  SLP Swallowing Diagnosis: mild, oral dysfunction  SLP Diet Recommendation: mechanical soft with no mixed consistencies, thin liquids  Recommended Precautions and Strategies: upright posture during/after eating, small bites of food and sips of liquid, alternate between small bites of food and sips of liquid  SLP Rec. for Method of Medication Administration: meds whole, with pudding or applesauce     Monitor for  Signs of Aspiration: notify SLP if any concerns     Swallow Criteria for Skilled Therapeutic Interventions Met: demonstrates skilled criteria  Anticipated Dischage Disposition: home  Rehab Potential/Prognosis, Swallowing: good, to achieve stated therapy goals  Therapy Frequency (Swallow): PRN  Predicted Duration Therapy Intervention (Days): until discharge       Plan of Care Reviewed With: patient, family, caregiver  Progress: improving  Outcome Summary: Bedside Swallow Evaluation completed.  Patient with hx of Parkinson's Disease per chart, however, speech not indicative of dysarthria associated with parkinson's.  Patient with very choppy speech and inability to coordinate articulation, voicing, and breath support in order to maintain fluent speech pattern.  Intentional tremor also observed more than resting tremor.  This caused difficulty with taking PO via spoon.  However, patient was able to take all consistencies without any s/s of aspiration.  She reports soft foods at home and difficulty with pills.  Did discuss avoiding mixed consistencies due to tremors and potential of difficulty controlling in oral cavity.  Rec: 1) Mech soft diet no mixed consistencies with thin liquids 2) Meds whole in applesauce 3) ST to follow for compensatory strategy training 4) Neuro consult if MD feels warranted due to SLP concerns with dx of Parkinson's.      SLP GOALS     Row Name 09/01/19 0936             Oral Nutrition/Hydration Goal 1 (SLP)    Oral Nutrition/Hydration Goal 1, SLP  LTG: Patient will tolerate LRD with no overt s/s of aspiration.  -KW      Time Frame (Oral Nutrition/Hydration Goal 1, SLP)  by discharge  -KW      Barriers (Oral Nutrition/Hydration Goal 1, SLP)  n/a  -KW      Progress/Outcomes (Oral Nutrition/Hydration Goal 1, SLP)  goal ongoing  -KW         Swallow Compensatory Strategies Goal 1 (SLP)    Activity (Swallow Compensatory Strategies/Techniques Goal 1, SLP)  compensatory strategies;small bites;small  straw sips;alternate food/liquid intake;other (see comments) clear oral prior to additional bites.  -KW      [place]/Accuracy (Swallow Compensatory Strategies/Techniques Goal 1, SLP)  independently (over 90% accuracy)  -KW      Time Frame (Swallow Compensatory Strategies/Techniques Goal 1, SLP)  short term goal (STG);by discharge  -KW      Barriers (Swallow Compensatory Strategies/Techniques Goal 1, SLP)  n/a  -KW      Progress/Outcomes (Swallow Compensatory Strategies/Techniques Goal 1, SLP)  goal ongoing  -KW        User Key  (r) = Recorded By, (t) = Taken By, (c) = Cosigned By    Initials Name Provider Type    Kristin Nair MS CCC-SLP Speech and Language Pathologist             Time Calculation:   Time Calculation- SLP     Row Name 09/01/19 1514             Time Calculation- SLP    SLP Start Time  1425  -KW      SLP Stop Time  1518  -KW      SLP Time Calculation (min)  53 min  -KW      SLP Received On  09/01/19  -KW      SLP Goal Re-Cert Due Date  09/11/19  -        User Key  (r) = Recorded By, (t) = Taken By, (c) = Cosigned By    Initials Name Provider Type    Kristin Nair MS CCC-SLP Speech and Language Pathologist          Therapy Charges for Today     Code Description Service Date Service Provider Modifiers Qty    [id number] HC ST EVAL ORAL PHARYNG SWALLOW 4 9/1/2019 Kristin Deras MS CCC-SLP GN 1               Kristin Deras MS CCC-ALAN  9/1/2019

## 2019-09-01 NOTE — H&P (VIEW-ONLY)
[facility] -  Progress Note     LOS: 1 day   Patient Care Team:  Amadou Rowan MD as PCP - General  Amadou Rowan MD as PCP - Family Medicine    Chief Complaint: F/U Elevated Troponin    Subjective   Resting in bed with visitor at bedside. Complaining of headache. Denies chest pain.      REVIEW OF SYSTEMS:  Constitutional: Denies fever or chills. Denies change in energy level or malaise.  HEENT: Headache. Denies difficulty swallowing or sore throat.  Respiratory: Denies cough or hoarseness. Denies shortness of breath.   Cardiovascular: Denies chest pain or pressure. Denies palpitations. Denies presyncope/syncope. Denies orthopnea/PND. Denies lower extremity edema.   Gastrointestinal: Denies abdominal pain. Denies nausea/vomiting. Denies change in bowel habits or history of recent GI tract blood loss.   Genitourinary: Denies urinary urgency or frequency. Denies dysuria, hematuria.  Musculoskeletal: Denies pain or swelling in joints.  Neurological: Denies paresthesias. Denies headache. Denies seizure or stroke symptoms.  Behavioral/Psych: Denies problems with anxiety or depression.    All other systems are negative except where stated above.      Objective     Vital Sign Min/Max for last 24 hours  Temp  Min: 97.6 °F (36.4 °C)  Max: 98.3 °F (36.8 °C)   BP  Min: 133/63  Max: 158/85   Pulse  Min: 59  Max: 89   Resp  Min: 16  Max: 25   SpO2  Min: 95 %  Max: 99 %   No Data Recorded   Weight  Min: 47.2 kg (104 lb)  Max: 51.7 kg (114 lb)         08/31/19  1656   Weight: 47.2 kg (104 lb)         Intake/Output Summary (Last 24 hours) at 9/1/2019 1105  Last data filed at 9/1/2019 0602  Gross per 24 hour   Intake —   Output 950 ml   Net -950 ml         Physical Exam:    General Appearance:    Alert, cooperative, in no acute distress. Difficult to understand speech 2' to advanced parkinson's.   HEENT:    Normocephalic.Atraumatic. HAYDE.    Neck:   Supple, trachea midline, no JVD   Lungs:      Clear to auscultation, respirations regular, even and         unlabored    Heart:    Regular rhythm and normal rate, normal S1 and S2, no murmur, no gallop, no rub, no click   Abdomen:     Normal bowel sounds, soft non-tender, non-distended, no guarding, no rebound tenderness   Extremities:   No edema, no cyanosis, no redness   Pulses:   Pulses palpable and equal bilaterally   Skin:   No bleeding, bruising or rash   Neurologic:   Cranial nerves 2 - 12 grossly intact. Resting tremor       Results Review:   Lab Results (last 72 hours)     Procedure Component Value Units Date/Time    Hemoglobin A1c [[id number]]  (Normal) Collected:  09/01/19 0204    Specimen:  Blood Updated:  09/01/19 0725     Hemoglobin A1C 5.4 %     Narrative:       Less than 6.0           Non-Diabetic Range  6.0-7.0                 ADA Therapeutic Target  Greater than 7.0        Action Suggested    Troponin [[id number]]  (Abnormal) Collected:  09/01/19 0204    Specimen:  Blood Updated:  09/01/19 0259     Troponin I 0.172 ng/mL     Lipid Panel [[id number]]  (Abnormal) Collected:  09/01/19 0204    Specimen:  Blood Updated:  09/01/19 0258     Total Cholesterol 79 mg/dL      Triglycerides 63 mg/dL      HDL Cholesterol 37 mg/dL      LDL Cholesterol  43 mg/dL      LDL/HDL Ratio 0.79    Basic Metabolic Panel [[id number]]  (Abnormal) Collected:  09/01/19 0204    Specimen:  Blood Updated:  09/01/19 0247     Glucose 110 mg/dL      BUN 14 mg/dL      Creatinine 0.65 mg/dL      Sodium 139 mmol/L      Potassium 4.3 mmol/L      Chloride 105 mmol/L      CO2 29.0 mmol/L      Calcium 8.9 mg/dL      eGFR Non African Amer 89 mL/min/1.73      BUN/Creatinine Ratio 21.5     Anion Gap 5.0 mmol/L     Narrative:       GFR Normal >60  Chronic Kidney Disease <60  Kidney Failure <15    CBC (No Diff) [[id number]]  (Abnormal) Collected:  09/01/19 0204    Specimen:  Blood Updated:  09/01/19 0236     WBC 8.44 10*3/mm3      RBC 3.61 10*6/mm3      Hemoglobin 12.3 g/dL      Hematocrit  35.8 %      MCV 99.2 fL      MCH 34.1 pg      MCHC 34.4 g/dL      RDW 12.5 %      RDW-SD 46.1 fl      MPV 10.4 fL      Platelets 144 10*3/mm3     Troponin [[id number]]  (Abnormal) Collected:  08/31/19 1926    Specimen:  Blood Updated:  08/31/19 2016     Troponin I 0.165 ng/mL     Lipase [[id number]]  (Normal) Collected:  08/31/19 1328    Specimen:  Blood Updated:  08/31/19 1654     Lipase 120 U/L     [place] Draw [[id number]] Collected:  08/31/19 1328    Specimen:  Blood Updated:  08/31/19 1430    Narrative:       The following orders were created for panel order [place] Draw.  Procedure                               Abnormality         Status                     ---------                               -----------         ------                     Light Blue Top[[id number]]                                   Final result               Green Top (Gel)[[id number]]                                  Final result               Lavender Top[[id number]]                                     Final result               Red Top[[id number]]                                          Final result                 Please view results for these tests on the individual orders.    Light Blue Top [[id number]] Collected:  08/31/19 1328    Specimen:  Blood Updated:  08/31/19 1430     Extra Tube hold for add-on     Comment: Auto resulted       Green Top (Gel) [[id number]] Collected:  08/31/19 1328    Specimen:  Blood Updated:  08/31/19 1430     Extra Tube Hold for add-ons.     Comment: Auto resulted.       Lavender Top [[id number]] Collected:  08/31/19 1328    Specimen:  Blood Updated:  08/31/19 1430     Extra Tube hold for add-on     Comment: Auto resulted       Red Top [[id number]] Collected:  08/31/19 1328    Specimen:  Blood Updated:  08/31/19 1430     Extra Tube Hold for add-ons.     Comment: Auto resulted.       Troponin [[id number]]  (Abnormal) Collected:  08/31/19 1328    Specimen:  Blood Updated:  08/31/19 1403     Troponin I 0.143 ng/mL      Comprehensive Metabolic Panel [[id number]]  (Abnormal) Collected:  08/31/19 1328    Specimen:  Blood Updated:  08/31/19 1352     Glucose 92 mg/dL      BUN 17 mg/dL      Creatinine 0.61 mg/dL      Sodium 140 mmol/L      Potassium 4.5 mmol/L      Chloride 106 mmol/L      CO2 28.0 mmol/L      Calcium 8.7 mg/dL      Total Protein 6.0 g/dL      Albumin 3.40 g/dL      ALT (SGPT) 100 U/L      AST (SGOT) 82 U/L      Alkaline Phosphatase 193 U/L      Total Bilirubin 0.5 mg/dL      eGFR Non African Amer 95 mL/min/1.73      Globulin 2.6 gm/dL      A/G Ratio 1.3 g/dL      BUN/Creatinine Ratio 27.9     Anion Gap 6.0 mmol/L     Narrative:       GFR Normal >60  Chronic Kidney Disease <60  Kidney Failure <15    CBC & Differential [[id number]] Collected:  08/31/19 1328    Specimen:  Blood Updated:  08/31/19 1335    Narrative:       The following orders were created for panel order CBC & Differential.  Procedure                               Abnormality         Status                     ---------                               -----------         ------                     CBC Auto Differential[[id number]]        Abnormal            Final result                 Please view results for these tests on the individual orders.    CBC Auto Differential [[id number]]  (Abnormal) Collected:  08/31/19 1328    Specimen:  Blood Updated:  08/31/19 1335     WBC 6.87 10*3/mm3      RBC 3.80 10*6/mm3      Hemoglobin 12.8 g/dL      Hematocrit 36.9 %      MCV 97.1 fL      MCH 33.7 pg      MCHC 34.7 g/dL      RDW 12.4 %      RDW-SD 44.1 fl      MPV 9.4 fL      Platelets 135 10*3/mm3      Neutrophil % 60.6 %      Lymphocyte % 19.8 %      Monocyte % 13.5 %      Eosinophil % 5.5 %      Basophil % 0.3 %      Immature Grans % 0.3 %      Neutrophils, Absolute 4.16 10*3/mm3      Lymphocytes, Absolute 1.36 10*3/mm3      Monocytes, Absolute 0.93 10*3/mm3      Eosinophils, Absolute 0.38 10*3/mm3      Basophils, Absolute 0.02 10*3/mm3      Immature Grans, Absolute  0.02 10*3/mm3      nRBC 0.0 /100 WBC               2D Echocardiogram:  Pending      US Abdomen:  Pending        Medication Review: yes  Current Facility-Administered Medications   Medication Dose Route Frequency Provider Last Rate Last Dose   • acetaminophen (TYLENOL) tablet 650 mg  650 mg Oral Q6H PRN Jazmine Watkins APRN   650 mg at 09/01/19 0810   • aspirin chewable tablet 324 mg  324 mg Oral Once Rocco Gomez DO       • aspirin EC tablet 81 mg  81 mg Oral Daily Ryland Lopes MD   81 mg at 09/01/19 0810   • atorvastatin (LIPITOR) tablet 40 mg  40 mg Oral Nightly Ryland Lopes MD   40 mg at 08/31/19 2015   • carbidopa-levodopa (SINEMET)  MG per tablet 1.5 tablet  1.5 tablet Oral BID With Meals Ryland Lopes MD   1.5 tablet at 09/01/19 0810   • enoxaparin (LOVENOX) syringe 50 mg  1 mg/kg Subcutaneous Q12H Ryland Lopes MD   50 mg at 09/01/19 0554   • folic acid (FOLVITE) tablet 400 mcg  400 mcg Oral Daily Ryland Lopes MD   400 mcg at 09/01/19 0810   • hydroxychloroquine (PLAQUENIL) tablet 100 mg  100 mg Oral Nightly Ryland Lopes MD   100 mg at 08/31/19 2013   • hydroxychloroquine (PLAQUENIL) tablet 200 mg  200 mg Oral Daily With Breakfast Ryland Lopes MD   200 mg at 09/01/19 0811   • methotrexate tablet 10 mg  10 mg Oral Weekly Ryland Lopes MD   10 mg at 08/31/19 2015   • nitroglycerin 50 mg/250 mL (0.2 mg/mL) infusion  10 mcg/min Intravenous Continuous Ryland Lopes MD 1.5 mL/hr at 09/01/19 0805 5 mcg/min at 09/01/19 0805   • pantoprazole (PROTONIX) EC tablet 40 mg  40 mg Oral Q AM Ryalnd Lopes MD   40 mg at 09/01/19 0554   • predniSONE (DELTASONE) tablet 2 mg  2 mg Oral BID Ryland Lopes MD   2 mg at 09/01/19 0810   • raloxifene (EVISTA) tablet 60 mg  60 mg Oral Daily Ryland Lopes MD   60 mg at 09/01/19 0810   • sodium chloride 0.9 % flush 10 mL  10 mL Intravenous Q12H Rlyand Lopes MD   10 mL at 09/01/19 0811   • sodium chloride 0.9 % flush 10 mL  10 mL  Intravenous PRN Ryland Lopes MD   10 mL at 08/31/19 2016         Assessment/Plan       Elevated troponin, Unclear Etiology - Patient has not had any further chest pain. Had negative Lexiscan 10/2018.     Parkinson's disease (CMS/[facility]) with Resting Tremor    Elevated LFT - Awaiting US Abdomen to be Completed    COPD (chronic obstructive pulmonary disease) (CMS/[facility]) - No Exacerbation    Gastroesophageal reflux disease without esophagitis - On PPI    Rheumatoid arthritis (CMS/[facility]) - Noted      RITO Queen  09/01/19  11:05 AM

## 2019-09-01 NOTE — PLAN OF CARE
PATIENT HAS A SKIN TEAR ON HER LEFT INDEX FINGER FROM A CONTINUOUS PULSE OX THAT WAS PULLED OFF. PULSE OX WAS PULLED OFF GENTLY AND STILL RIPPED THE PATIENTS SKIN RESULTING IN SKIN TEAR.

## 2019-09-01 NOTE — PLAN OF CARE
Problem: Patient Care Overview  Goal: Plan of Care Review  Outcome: Ongoing (interventions implemented as appropriate)   09/01/19 1512   Coping/Psychosocial   Plan of Care Reviewed With patient;family;caregiver   Plan of Care Review   Progress improving   OTHER   Outcome Summary Bedside Swallow Evaluation completed. Patient with hx of Parkinson's Disease per chart, however, speech not indicative of dysarthria associated with parkinson's. Patient with very choppy speech and inability to coordinate articulation, voicing, and breath support in order to maintain fluent speech pattern. Intentional tremor also observed more than resting tremor. This caused difficulty with taking PO via spoon. However, patient was able to take all consistencies without any s/s of aspiration. She reports soft foods at home and difficulty with pills. Did discuss avoiding mixed consistencies due to tremors and potential of difficulty controlling in oral cavity. Rec: 1) Mech soft diet no mixed consistencies with thin liquids 2) Meds whole in applesauce 3) ST to follow for compensatory strategy training 4) Neuro consult if MD feels warranted due to SLP concerns with dx of Parkinson's.

## 2019-09-01 NOTE — PLAN OF CARE
Problem: Patient Care Overview  Goal: Plan of Care Review  Outcome: Ongoing (interventions implemented as appropriate)   09/01/19 7472   Coping/Psychosocial   Plan of Care Reviewed With patient   Plan of Care Review   Progress improving   OTHER   Outcome Summary VSS. NITRO GTT OFF WITH NO COMPLAINTS OF CHEST PAIN. WENT FOR AN ABD U/S WHICH SHOWED ATHEROSCLEROSIS OF ABDOMINAL AORTA. CURRENTLY AWAITING ECHO. SAFETY MAINTAINED, NO FALLS. WILL CONT TO MONITOR.

## 2019-09-01 NOTE — PLAN OF CARE
Problem: Patient Care Overview  Goal: Plan of Care Review  Outcome: Ongoing (interventions implemented as appropriate)   09/01/19 1222   Coping/Psychosocial   Plan of Care Reviewed With patient;family   Plan of Care Review   Progress no change   OTHER   Outcome Summary OT diogo completed. Pt is independent to SBA for all mobility including bed mobility, transfers and ambulation in hallway with cane. She was able to don/doff socks at EOB independently. She has a slight balance impairment evident during walking but had no LOB. OT educated pt on home safety and DME recs for discharge. She is safe for discharge home with assist when stable.

## 2019-09-01 NOTE — PROGRESS NOTES
[facility] -  Progress Note     LOS: 1 day   Patient Care Team:  Amadou Rowan MD as PCP - General  Amadou Rowan MD as PCP - Family Medicine    Chief Complaint: F/U Elevated Troponin    Subjective   Resting in bed with visitor at bedside. Complaining of headache. Denies chest pain.      REVIEW OF SYSTEMS:  Constitutional: Denies fever or chills. Denies change in energy level or malaise.  HEENT: Headache. Denies difficulty swallowing or sore throat.  Respiratory: Denies cough or hoarseness. Denies shortness of breath.   Cardiovascular: Denies chest pain or pressure. Denies palpitations. Denies presyncope/syncope. Denies orthopnea/PND. Denies lower extremity edema.   Gastrointestinal: Denies abdominal pain. Denies nausea/vomiting. Denies change in bowel habits or history of recent GI tract blood loss.   Genitourinary: Denies urinary urgency or frequency. Denies dysuria, hematuria.  Musculoskeletal: Denies pain or swelling in joints.  Neurological: Denies paresthesias. Denies headache. Denies seizure or stroke symptoms.  Behavioral/Psych: Denies problems with anxiety or depression.    All other systems are negative except where stated above.      Objective     Vital Sign Min/Max for last 24 hours  Temp  Min: 97.6 °F (36.4 °C)  Max: 98.3 °F (36.8 °C)   BP  Min: 133/63  Max: 158/85   Pulse  Min: 59  Max: 89   Resp  Min: 16  Max: 25   SpO2  Min: 95 %  Max: 99 %   No Data Recorded   Weight  Min: 47.2 kg (104 lb)  Max: 51.7 kg (114 lb)         08/31/19  1656   Weight: 47.2 kg (104 lb)         Intake/Output Summary (Last 24 hours) at 9/1/2019 1105  Last data filed at 9/1/2019 0602  Gross per 24 hour   Intake --   Output 950 ml   Net -950 ml         Physical Exam:    General Appearance:    Alert, cooperative, in no acute distress. Difficult to understand speech 2' to advanced parkinson's.   HEENT:    Normocephalic.Atraumatic. HAYDE.    Neck:   Supple, trachea midline, no JVD    Lungs:     Clear to auscultation, respirations regular, even and         unlabored    Heart:    Regular rhythm and normal rate, normal S1 and S2, no murmur, no gallop, no rub, no click   Abdomen:     Normal bowel sounds, soft non-tender, non-distended, no guarding, no rebound tenderness   Extremities:   No edema, no cyanosis, no redness   Pulses:   Pulses palpable and equal bilaterally   Skin:   No bleeding, bruising or rash   Neurologic:   Cranial nerves 2 - 12 grossly intact. Resting tremor       Results Review:   Lab Results (last 72 hours)     Procedure Component Value Units Date/Time    Hemoglobin A1c [[id number]]  (Normal) Collected:  09/01/19 0204    Specimen:  Blood Updated:  09/01/19 0725     Hemoglobin A1C 5.4 %     Narrative:       Less than 6.0           Non-Diabetic Range  6.0-7.0                 ADA Therapeutic Target  Greater than 7.0        Action Suggested    Troponin [[id number]]  (Abnormal) Collected:  09/01/19 0204    Specimen:  Blood Updated:  09/01/19 0259     Troponin I 0.172 ng/mL     Lipid Panel [[id number]]  (Abnormal) Collected:  09/01/19 0204    Specimen:  Blood Updated:  09/01/19 0258     Total Cholesterol 79 mg/dL      Triglycerides 63 mg/dL      HDL Cholesterol 37 mg/dL      LDL Cholesterol  43 mg/dL      LDL/HDL Ratio 0.79    Basic Metabolic Panel [[id number]]  (Abnormal) Collected:  09/01/19 0204    Specimen:  Blood Updated:  09/01/19 0247     Glucose 110 mg/dL      BUN 14 mg/dL      Creatinine 0.65 mg/dL      Sodium 139 mmol/L      Potassium 4.3 mmol/L      Chloride 105 mmol/L      CO2 29.0 mmol/L      Calcium 8.9 mg/dL      eGFR Non African Amer 89 mL/min/1.73      BUN/Creatinine Ratio 21.5     Anion Gap 5.0 mmol/L     Narrative:       GFR Normal >60  Chronic Kidney Disease <60  Kidney Failure <15    CBC (No Diff) [[id number]]  (Abnormal) Collected:  09/01/19 0204    Specimen:  Blood Updated:  09/01/19 0236     WBC 8.44 10*3/mm3      RBC 3.61 10*6/mm3      Hemoglobin 12.3 g/dL       Hematocrit 35.8 %      MCV 99.2 fL      MCH 34.1 pg      MCHC 34.4 g/dL      RDW 12.5 %      RDW-SD 46.1 fl      MPV 10.4 fL      Platelets 144 10*3/mm3     Troponin [[id number]]  (Abnormal) Collected:  08/31/19 1926    Specimen:  Blood Updated:  08/31/19 2016     Troponin I 0.165 ng/mL     Lipase [[id number]]  (Normal) Collected:  08/31/19 1328    Specimen:  Blood Updated:  08/31/19 1654     Lipase 120 U/L     [place] Draw [[id number]] Collected:  08/31/19 1328    Specimen:  Blood Updated:  08/31/19 1430    Narrative:       The following orders were created for panel order [place] Draw.  Procedure                               Abnormality         Status                     ---------                               -----------         ------                     Light Blue Top[[id number]]                                   Final result               Green Top (Gel)[[id number]]                                  Final result               Lavender Top[[id number]]                                     Final result               Red Top[[id number]]                                          Final result                 Please view results for these tests on the individual orders.    Light Blue Top [[id number]] Collected:  08/31/19 1328    Specimen:  Blood Updated:  08/31/19 1430     Extra Tube hold for add-on     Comment: Auto resulted       Green Top (Gel) [[id number]] Collected:  08/31/19 1328    Specimen:  Blood Updated:  08/31/19 1430     Extra Tube Hold for add-ons.     Comment: Auto resulted.       Lavender Top [[id number]] Collected:  08/31/19 1328    Specimen:  Blood Updated:  08/31/19 1430     Extra Tube hold for add-on     Comment: Auto resulted       Red Top [[id number]] Collected:  08/31/19 1328    Specimen:  Blood Updated:  08/31/19 1430     Extra Tube Hold for add-ons.     Comment: Auto resulted.       Troponin [[id number]]  (Abnormal) Collected:  08/31/19 1328    Specimen:  Blood Updated:  08/31/19 1403     Troponin I 0.143  ng/mL     Comprehensive Metabolic Panel [[id number]]  (Abnormal) Collected:  08/31/19 1328    Specimen:  Blood Updated:  08/31/19 1352     Glucose 92 mg/dL      BUN 17 mg/dL      Creatinine 0.61 mg/dL      Sodium 140 mmol/L      Potassium 4.5 mmol/L      Chloride 106 mmol/L      CO2 28.0 mmol/L      Calcium 8.7 mg/dL      Total Protein 6.0 g/dL      Albumin 3.40 g/dL      ALT (SGPT) 100 U/L      AST (SGOT) 82 U/L      Alkaline Phosphatase 193 U/L      Total Bilirubin 0.5 mg/dL      eGFR Non African Amer 95 mL/min/1.73      Globulin 2.6 gm/dL      A/G Ratio 1.3 g/dL      BUN/Creatinine Ratio 27.9     Anion Gap 6.0 mmol/L     Narrative:       GFR Normal >60  Chronic Kidney Disease <60  Kidney Failure <15    CBC & Differential [[id number]] Collected:  08/31/19 1328    Specimen:  Blood Updated:  08/31/19 1335    Narrative:       The following orders were created for panel order CBC & Differential.  Procedure                               Abnormality         Status                     ---------                               -----------         ------                     CBC Auto Differential[[id number]]        Abnormal            Final result                 Please view results for these tests on the individual orders.    CBC Auto Differential [[id number]]  (Abnormal) Collected:  08/31/19 1328    Specimen:  Blood Updated:  08/31/19 1335     WBC 6.87 10*3/mm3      RBC 3.80 10*6/mm3      Hemoglobin 12.8 g/dL      Hematocrit 36.9 %      MCV 97.1 fL      MCH 33.7 pg      MCHC 34.7 g/dL      RDW 12.4 %      RDW-SD 44.1 fl      MPV 9.4 fL      Platelets 135 10*3/mm3      Neutrophil % 60.6 %      Lymphocyte % 19.8 %      Monocyte % 13.5 %      Eosinophil % 5.5 %      Basophil % 0.3 %      Immature Grans % 0.3 %      Neutrophils, Absolute 4.16 10*3/mm3      Lymphocytes, Absolute 1.36 10*3/mm3      Monocytes, Absolute 0.93 10*3/mm3      Eosinophils, Absolute 0.38 10*3/mm3      Basophils, Absolute 0.02 10*3/mm3      Immature Grans,  Absolute 0.02 10*3/mm3      nRBC 0.0 /100 WBC               2D Echocardiogram:  Pending      US Abdomen:  Pending        Medication Review: yes  Current Facility-Administered Medications   Medication Dose Route Frequency Provider Last Rate Last Dose   • acetaminophen (TYLENOL) tablet 650 mg  650 mg Oral Q6H PRN Jazmine Watkins APRN   650 mg at 09/01/19 0810   • aspirin chewable tablet 324 mg  324 mg Oral Once Rocco Gomez DO       • aspirin EC tablet 81 mg  81 mg Oral Daily Ryland Lopes MD   81 mg at 09/01/19 0810   • atorvastatin (LIPITOR) tablet 40 mg  40 mg Oral Nightly Ryland Lopes MD   40 mg at 08/31/19 2015   • carbidopa-levodopa (SINEMET)  MG per tablet 1.5 tablet  1.5 tablet Oral BID With Meals Ryland Lopes MD   1.5 tablet at 09/01/19 0810   • enoxaparin (LOVENOX) syringe 50 mg  1 mg/kg Subcutaneous Q12H Ryland Lopes MD   50 mg at 09/01/19 0554   • folic acid (FOLVITE) tablet 400 mcg  400 mcg Oral Daily Ryland Lopes MD   400 mcg at 09/01/19 0810   • hydroxychloroquine (PLAQUENIL) tablet 100 mg  100 mg Oral Nightly Ryland Lopes MD   100 mg at 08/31/19 2013   • hydroxychloroquine (PLAQUENIL) tablet 200 mg  200 mg Oral Daily With Breakfast Ryland Lopes MD   200 mg at 09/01/19 0811   • methotrexate tablet 10 mg  10 mg Oral Weekly Ryland Lopes MD   10 mg at 08/31/19 2015   • nitroglycerin 50 mg/250 mL (0.2 mg/mL) infusion  10 mcg/min Intravenous Continuous Ryland Lopes MD 1.5 mL/hr at 09/01/19 0805 5 mcg/min at 09/01/19 0805   • pantoprazole (PROTONIX) EC tablet 40 mg  40 mg Oral Q AM Ryland Lopes MD   40 mg at 09/01/19 0554   • predniSONE (DELTASONE) tablet 2 mg  2 mg Oral BID Ryland Lopes MD   2 mg at 09/01/19 0810   • raloxifene (EVISTA) tablet 60 mg  60 mg Oral Daily Ryland Lopes MD   60 mg at 09/01/19 0810   • sodium chloride 0.9 % flush 10 mL  10 mL Intravenous Q12H Ryland Lopes MD   10 mL at 09/01/19 0811   • sodium chloride 0.9 % flush 10 mL   10 mL Intravenous PRN Ryland Lopes MD   10 mL at 08/31/19 2016         Assessment/Plan       Elevated troponin, Unclear Etiology - Patient has not had any further chest pain. Had negative Lexiscan 10/2018.     Parkinson's disease (CMS/[facility]) with Resting Tremor    Elevated LFT - Awaiting US Abdomen to be Completed    COPD (chronic obstructive pulmonary disease) (CMS/[facility]) - No Exacerbation    Gastroesophageal reflux disease without esophagitis - On PPI    Rheumatoid arthritis (CMS/[facility]) - Noted      Jazmine Watkins, RITO  09/01/19  11:05 AM

## 2019-09-01 NOTE — THERAPY DISCHARGE NOTE
"Acute Care - Occupational Therapy Initial Eval/Discharge  [facility]     Patient Name: Kristen Mittal  : 1945  MRN: [id number]  Today's Date: 2019  Onset of Illness/Injury or Date of Surgery: 19  Date of Referral to OT: 19  Referring Physician: Dr. Lopes      Admit Date: 2019       ICD-10-CM ICD-9-CM   1. Non-STEMI (non-ST elevated myocardial infarction) (CMS/[facility]) I21.4 410.70     Patient Active Problem List   Diagnosis   • Second degree AV block, Mobitz type II   • Symptomatic bradycardia   • Heart block   • Elevated troponin   • Parkinson's disease (CMS/HCC)   • COPD (chronic obstructive pulmonary disease) (CMS/[facility])   • Gastroesophageal reflux disease without esophagitis   • Rheumatoid arthritis (CMS/HCC)     Past Medical History:   Diagnosis Date   • COPD (chronic obstructive pulmonary disease) (CMS/[facility])    • GERD (gastroesophageal reflux disease)    • Osteoporosis    • Parkinson's disease (CMS/[facility])    • Rheumatoid arthritis (CMS/[facility])    • Speech abnormality     Pt states \"they dont know why my speech is like this but it has been that way for a long time.\"   • Stroke (CMS/[facility])     Pt states she has not had a stroke and doesnt know why its listed.     Past Surgical History:   Procedure Laterality Date   • CARDIAC ELECTROPHYSIOLOGY PROCEDURE N/A 2019    Procedure: Pacemaker SC new;  Surgeon: Christiano Jordan MD;  Location: [facility] LOCATION;  Service: Cardiology          OT ASSESSMENT FLOWSHEET (last 12 hours)      Occupational Therapy Evaluation     Row Name 19 1100                   OT Evaluation Time/Intention    Subjective Information  complains of;weakness;pain;fatigue;numbness;dyspnea;nausea/vomiting numbness in B toes  -AC        Document Type  evaluation  -AC        Mode of Treatment  occupational therapy  -AC        Patient Effort  good  -AC           General Information    Patient Profile Reviewed?  yes  -AC        Onset of Illness/Injury or " Date of Surgery  08/31/19  -        Referring Physician  Dr. Lopes  -        Patient Observations  alert;cooperative;agree to therapy  -AC        Patient/Family Observations  sister in law present  -AC        General Observations of Patient  lying in fowlers in bed, no apparent distress  -AC        Prior Level of Function  independent:;all household mobility;community mobility;gait;transfer;ADL's;home management;cooking;cleaning  -AC        Equipment Currently Used at Home  bath bench;walker, standard hurrycane, planning to purchase tub bench within next week  -AC        Pertinent History of Current Functional Problem  midespigastric pain x4 days, h/o pacemaker placement in July 2019 and Parkinson's ds; Dx: NSTEMI  -AC        Existing Precautions/Restrictions  fall  -AC        Risks Reviewed  patient and family:;LOB;nausea/vomiting;dizziness;increased discomfort;change in vital signs  -AC        Benefits Reviewed  patient and family:;improve function;increase independence;increase strength;increase balance;decrease pain;decrease risk of DVT;improve skin integrity;increase knowledge  -AC        Barriers to Rehab  none identified  -AC           Relationship/Environment    Lives With  sibling(s)  -AC           Resource/Environmental Concerns    Current Living Arrangements  home/apartment/condo  -           Home Main Entrance    Number of Stairs, Main Entrance  one  -AC           Cognitive Assessment/Interventions    Additional Documentation  Cognitive Assessment/Intervention (Group)  -AC           Cognitive Assessment/Intervention- PT/OT    Affect/Mental Status (Cognitive)  WNL  -AC        Orientation Status (Cognition)  oriented x 4  -AC        Follows Commands (Cognition)  WNL  -AC        Personal Safety Interventions  fall prevention program maintained;gait belt;muscle strengthening facilitated;nonskid shoes/slippers when out of bed;supervised activity  -AC           Safety Issues, Functional Mobility     Impairments Affecting Function (Mobility)  balance  -           Bed Mobility Assessment/Treatment    Bed Mobility Assessment/Treatment  scooting/bridging;supine-sit  -        Scooting/Bridging [place] (Bed Mobility)  independent  -AC        Supine-Sit [place] (Bed Mobility)  independent  -           Functional Mobility    Functional Mobility- Ind. Level  standby assist;contact guard assist  -        Functional Mobility- Device  -- hurrycane  -        Functional Mobility- Comment  in hallway, back to  bed  -           Transfer Assessment/Treatment    Transfer Assessment/Treatment  sit-stand transfer;stand-sit transfer  -           Sit-Stand Transfer    Sit-Stand [place] (Transfers)  conditional independence  -        Assistive Device (Sit-Stand Transfers)  cane, straight  -AC           Stand-Sit Transfer    Stand-Sit [place] (Transfers)  conditional independence  -        Assistive Device (Stand-Sit Transfers)  cane, straight  -           ADL Assessment/Intervention    BADL Assessment/Intervention  lower body dressing  -           Lower Body Dressing Assessment/Training    Lower Body Dressing [place] Level  don;doff;socks;independent  -AC        Lower Body Dressing Position  edge of bed sitting  -           BADL Safety/Performance    Impairments, BADL Safety/Performance  balance  -           General ROM    GENERAL ROM COMMENTS  WFL AROM BUE, deferred L shoulder ROM due to recent pacemaker placement  -           MMT (Manual Muscle Testing)    General MMT Comments  4/5 BUE  -           Motor Assessment/Interventions    Additional Documentation  Balance (Group)  -           Balance    Balance  static sitting balance;static standing balance;dynamic sitting balance;dynamic standing balance  -           Static Sitting Balance    Level of [place] (Unsupported Sitting, Static Balance)  independent  -        Sitting Position (Unsupported Sitting, Static  Balance)  sitting on edge of bed  -AC           Dynamic Sitting Balance    Level of [place], Reaches Outside Midline (Sitting, Dynamic Balance)  independent  -AC        Sitting Position, Reaches Outside Midline (Sitting, Dynamic Balance)  sitting on edge of bed  -AC           Static Standing Balance    Level of [place] (Supported Standing, Static Balance)  independent  -AC        Assistive Device Utilized (Supported Standing, Static Balance)  cane, straight  -AC           Dynamic Standing Balance    Level of [place], Reaches Outside Midline (Standing, Dynamic Balance)  standby assist  -AC        Assistive Device Utilized (Supported Standing, Dynamic Balance)  cane, straight  -AC           Sensory Assessment/Intervention    Sensory General Assessment  -- numbness/tingling in B toes  -AC           Positioning and Restraints    Pre-Treatment Position  in bed  -AC        Post Treatment Position  bed  -AC        In Bed  sitting EOB;call light within reach;encouraged to call for assist;with family/caregiver;side rails up x2  -AC           Pain Assessment    Additional Documentation  Pain Scale: Numbers Pre/Post-Treatment (Group)  -AC           Pain Scale: Numbers Pre/Post-Treatment    Pain Scale: Numbers, Pretreatment  9/10  -AC        Pain Scale: Numbers, Post-Treatment  9/10  -AC        Pain Location  head  -AC        Pain Intervention(s)  Medication (See MAR);Repositioned;Ambulation/increased activity  -           Plan of Care Review    Plan of Care Reviewed With  patient  -AC           Clinical Impression (OT)    Date of Referral to OT  08/31/19  -        OT Diagnosis  decreased adl  -AC        Prognosis (OT Eval)  good  -AC        Criteria for Skilled Therapeutic Interventions Met (OT Eval)  no problems identified which require skilled intervention  -AC        Therapy Frequency (OT Eval)  evaluation only  -AC        Care Plan Review (OT)  evaluation/treatment results reviewed;care plan/treatment  goals reviewed;risks/benefits reviewed;current/potential barriers reviewed;patient/other agree to care plan  -        Anticipated Discharge Disposition (OT)  home with assist  -           Living Environment    Home Accessibility  stairs to enter home;tub/shower is not walk in  -          User Key  (r) = Recorded By, (t) = Taken By, (c) = Cosigned By    Initials Name Effective Dates     Benny Resendiz, OTR/LMEAGHAN 04/09/19 -           Occupational Therapy Education     Title: PT OT SLP Therapies (Done)     Topic: Occupational Therapy (Done)     Point: ADL training (Done)     Description: Instruct learner(s) on proper safety adaptation and remediation techniques during self care or transfers.   Instruct in proper use of assistive devices.    Learning Progress Summary           Patient Acceptance, E,TB, VU by  at 9/1/2019 12:22 PM    Comment:  DME recs, home safety                               User Key     Initials Effective Dates Name Provider Type Discipline     04/09/19 -  Benny Resendiz, OTR/L, MEAGHAN Occupational Therapist OT                OT Recommendation and Plan  Outcome Summary/Treatment Plan (OT)  Anticipated Discharge Disposition (OT): home with assist  Therapy Frequency (OT Eval): evaluation only  Plan of Care Review  Plan of Care Reviewed With: patient, family  Plan of Care Reviewed With: patient, family  Outcome Summary: OT eval completed.  Pt is independent to A for all mobility including bed mobility, transfers and ambulation in hallway with cane.  She was able to don/doff socks at EOB independently.  She has a slight balance impairment evident during walking but had no LOB.  OT educated pt on home safety and DME recs for discharge.  She is safe for discharge home with assist when stable.         Outcome Measures     Row Name 09/01/19 1100             How much help from another is currently needed...    Putting on and taking off regular lower body clothing?  4  -AC      Bathing  (including washing, rinsing, and drying)  4  -AC      Toileting (which includes using toilet bed pan or urinal)  4  -AC      Putting on and taking off regular upper body clothing  4  -AC      Taking care of personal grooming (such as brushing teeth)  4  -AC      Eating meals  4  -AC      AM-PAC 6 Clicks Score (OT)  24  -AC         Functional Assessment    Outcome Measure Options  AM-PAC 6 Clicks Daily Activity (OT)  -AC        User Key  (r) = Recorded By, (t) = Taken By, (c) = Cosigned By    Initials Name Provider Type    Benny Tolbert OTR/L, MEAGHAN Occupational Therapist          Time Calculation:   Time Calculation- OT     Row Name 09/01/19 1224             Time Calculation- OT    OT Start Time  1100  -AC      OT Stop Time  1145  -AC      OT Time Calculation (min)  45 min  -      OT Received On  09/01/19  -        User Key  (r) = Recorded By, (t) = Taken By, (c) = Cosigned By    Initials Name Provider Type    Benny Tolbert OTR/L, CNT Occupational Therapist        Therapy Suggested Charges     Code   Minutes Charges    None           Therapy Charges for Today     Code Description Service Date Service Provider Modifiers Qty    [id number]  OT EVAL LOW COMPLEXITY 3 9/1/2019 Benny Resendiz OTR/L, CNT GO 1               OT Discharge Summary  Anticipated Discharge Disposition (OT): home with assist  Reason for Discharge: At baseline function  Outcomes Achieved: Other(1 time eval)  Discharge Destination: Home with assist    RUDY Lunsford/L, CNT  9/1/2019

## 2019-09-01 NOTE — PLAN OF CARE
Problem: Patient Care Overview  Goal: Plan of Care Review  Outcome: Ongoing (interventions implemented as appropriate)   09/01/19 1174   Coping/Psychosocial   Plan of Care Reviewed With patient   Plan of Care Review   Progress no change   OTHER   Outcome Summary D/c'd nitro drip due to headache complaint. Patient has no c/o chest pain. Up with cane to BR. No falls noted. To have ABD US today. Speech consult for difficulty swallowing. Elevated troponin, highest so far 0.172, Dr. Majano aware of elevation. Continue to monitor.      Goal: Individualization and Mutuality  Outcome: Ongoing (interventions implemented as appropriate)    Goal: Discharge Needs Assessment  Outcome: Ongoing (interventions implemented as appropriate)    Goal: Interprofessional Rounds/Family Conf  Outcome: Ongoing (interventions implemented as appropriate)      Problem: Cardiac: ACS (Acute Coronary Syndrome) (Adult)  Goal: Signs and Symptoms of Listed Potential Problems Will be Absent, Minimized or Managed (Cardiac: ACS)  Outcome: Ongoing (interventions implemented as appropriate)

## 2019-09-02 VITALS
DIASTOLIC BLOOD PRESSURE: 53 MMHG | HEART RATE: 63 BPM | HEIGHT: 62 IN | BODY MASS INDEX: 18.97 KG/M2 | OXYGEN SATURATION: 99 % | SYSTOLIC BLOOD PRESSURE: 138 MMHG | RESPIRATION RATE: 17 BRPM | WEIGHT: 103.1 LBS | TEMPERATURE: 97.3 F

## 2019-09-02 PROBLEM — I21.4 NON-STEMI (NON-ST ELEVATED MYOCARDIAL INFARCTION) (HCC): Status: ACTIVE | Noted: 2019-08-31

## 2019-09-02 PROBLEM — R00.0 TACHYCARDIA: Status: ACTIVE | Noted: 2019-09-02

## 2019-09-02 PROCEDURE — 25010000002 ENOXAPARIN PER 10 MG: Performed by: INTERNAL MEDICINE

## 2019-09-02 PROCEDURE — 25010000002 DIPHENHYDRAMINE PER 50 MG: Performed by: INTERNAL MEDICINE

## 2019-09-02 PROCEDURE — 94799 UNLISTED PULMONARY SVC/PX: CPT

## 2019-09-02 PROCEDURE — 63710000001 PREDNISONE PER 5 MG: Performed by: INTERNAL MEDICINE

## 2019-09-02 PROCEDURE — 0 IOPAMIDOL PER 1 ML: Performed by: INTERNAL MEDICINE

## 2019-09-02 PROCEDURE — 93458 L HRT ARTERY/VENTRICLE ANGIO: CPT | Performed by: INTERNAL MEDICINE

## 2019-09-02 PROCEDURE — 25010000002 HEPARIN (PORCINE): Performed by: INTERNAL MEDICINE

## 2019-09-02 PROCEDURE — 99152 MOD SED SAME PHYS/QHP 5/>YRS: CPT | Performed by: INTERNAL MEDICINE

## 2019-09-02 PROCEDURE — B2111ZZ FLUOROSCOPY OF MULTIPLE CORONARY ARTERIES USING LOW OSMOLAR CONTRAST: ICD-10-PCS | Performed by: INTERNAL MEDICINE

## 2019-09-02 PROCEDURE — 25010000002 MIDAZOLAM PER 1 MG: Performed by: INTERNAL MEDICINE

## 2019-09-02 PROCEDURE — 4A023N7 MEASUREMENT OF CARDIAC SAMPLING AND PRESSURE, LEFT HEART, PERCUTANEOUS APPROACH: ICD-10-PCS | Performed by: INTERNAL MEDICINE

## 2019-09-02 PROCEDURE — B2151ZZ FLUOROSCOPY OF LEFT HEART USING LOW OSMOLAR CONTRAST: ICD-10-PCS | Performed by: INTERNAL MEDICINE

## 2019-09-02 PROCEDURE — C1894 INTRO/SHEATH, NON-LASER: HCPCS | Performed by: INTERNAL MEDICINE

## 2019-09-02 PROCEDURE — C1760 CLOSURE DEV, VASC: HCPCS | Performed by: INTERNAL MEDICINE

## 2019-09-02 PROCEDURE — 25010000002 FENTANYL CITRATE (PF) 100 MCG/2ML SOLUTION: Performed by: INTERNAL MEDICINE

## 2019-09-02 PROCEDURE — 99024 POSTOP FOLLOW-UP VISIT: CPT | Performed by: INTERNAL MEDICINE

## 2019-09-02 RX ORDER — LIDOCAINE HYDROCHLORIDE 20 MG/ML
INJECTION, SOLUTION INFILTRATION; PERINEURAL AS NEEDED
Status: DISCONTINUED | OUTPATIENT
Start: 2019-09-02 | End: 2019-09-02 | Stop reason: HOSPADM

## 2019-09-02 RX ORDER — FENTANYL CITRATE 50 UG/ML
INJECTION, SOLUTION INTRAMUSCULAR; INTRAVENOUS AS NEEDED
Status: DISCONTINUED | OUTPATIENT
Start: 2019-09-02 | End: 2019-09-02 | Stop reason: HOSPADM

## 2019-09-02 RX ORDER — DIPHENHYDRAMINE HYDROCHLORIDE 50 MG/ML
INJECTION INTRAMUSCULAR; INTRAVENOUS AS NEEDED
Status: DISCONTINUED | OUTPATIENT
Start: 2019-09-02 | End: 2019-09-02 | Stop reason: HOSPADM

## 2019-09-02 RX ORDER — SODIUM CHLORIDE 9 MG/ML
100 INJECTION, SOLUTION INTRAVENOUS CONTINUOUS
Status: DISPENSED | OUTPATIENT
Start: 2019-09-02 | End: 2019-09-02

## 2019-09-02 RX ORDER — ASPIRIN 81 MG/1
TABLET, CHEWABLE ORAL AS NEEDED
Status: DISCONTINUED | OUTPATIENT
Start: 2019-09-02 | End: 2019-09-02 | Stop reason: HOSPADM

## 2019-09-02 RX ORDER — MIDAZOLAM HYDROCHLORIDE 1 MG/ML
INJECTION INTRAMUSCULAR; INTRAVENOUS AS NEEDED
Status: DISCONTINUED | OUTPATIENT
Start: 2019-09-02 | End: 2019-09-02 | Stop reason: HOSPADM

## 2019-09-02 RX ADMIN — PREDNISONE 2 MG: 1 TABLET ORAL at 09:17

## 2019-09-02 RX ADMIN — RALOXIFENE HYDROCHLORIDE 60 MG: 60 TABLET, FILM COATED ORAL at 09:17

## 2019-09-02 RX ADMIN — SODIUM CHLORIDE, PRESERVATIVE FREE 10 ML: 5 INJECTION INTRAVENOUS at 09:18

## 2019-09-02 RX ADMIN — ENOXAPARIN SODIUM 50 MG: 60 INJECTION SUBCUTANEOUS at 06:48

## 2019-09-02 RX ADMIN — Medication 400 MCG: at 09:17

## 2019-09-02 RX ADMIN — HYDROXYCHLOROQUINE SULFATE 200 MG: 200 TABLET ORAL at 09:17

## 2019-09-02 NOTE — INTERVAL H&P NOTE
H&P reviewed. The patient was examined and there are no changes to the H&P.       She does report another episode of chest discomfort overnight, but is pain-free currently.  Will proceed with cath.    I discussed cardiac catheterization, the procedure, risks (including bleeding, infection, vascular damage [including minor oozing, bruising, bleeding, and up to and including the need for vascular surgery], contrast reaction, renal failure, respiratory failure, heart attack, stroke, arrhythmia and even death), benefits, and alternatives and the patient has voiced understanding and is willing to proceed.

## 2019-09-02 NOTE — PLAN OF CARE
Problem: Patient Care Overview  Goal: Plan of Care Review  Outcome: Ongoing (interventions implemented as appropriate)   09/02/19 7035   Plan of Care Review   Progress no change   OTHER   Outcome Summary Dr. Lopes and Naseem to decide if doing a cardiac cath this morning. No c/o chest pain voiced. Dressing to finger, skin tear, changed, difficulty stopping bleeding. No falls noted. Continue to mointor.      Goal: Individualization and Mutuality  Outcome: Ongoing (interventions implemented as appropriate)    Goal: Discharge Needs Assessment  Outcome: Ongoing (interventions implemented as appropriate)    Goal: Interprofessional Rounds/Family Conf  Outcome: Ongoing (interventions implemented as appropriate)      Problem: Cardiac: ACS (Acute Coronary Syndrome) (Adult)  Goal: Signs and Symptoms of Listed Potential Problems Will be Absent, Minimized or Managed (Cardiac: ACS)  Outcome: Ongoing (interventions implemented as appropriate)

## 2019-09-02 NOTE — DISCHARGE SUMMARY
[facility] HEART GROUP DISCHARGE    Date of Discharge:  9/2/2019    Discharge Diagnosis:   1.  Non-STEMI  2.  Parkinson's  3.  Essential hypertension   4.  Tachycardia    Presenting Problem/History of Present Illness  Non-STEMI (non-ST elevated myocardial infarction) (CMS/[facility]) [I21.4]  HPI:  74-year-old female with Parkinson's disease, resulting in difficulty understanding speech, who presented to her local ER today for chest discomfort.  She was subsequently transferred to our facility after initial troponin was mildly elevated.  She did receive aspirin but no other anticoagulation prior to transfer.  Upon arrival here, she tells me that she has been having intermittent substernal/epigastric discomfort characterized as tightness for the last 4 days.  Reports episodes have typically been lasting only a matter of seconds, but today he has had this morning had a longer episode that was more severe and associated with nausea and diaphoresis.  Her sister-in-law observe this, and stay the patient did look pale and sweaty for about 30 minutes.  She gave her some orange juice, and afterwards checked her sugar which was in the 90s.  She states patient gradually regained her color, but the patient did seek out assistance from the sister-in-law complaining of worsened discomfort at the onset of the previously described episode.  Currently, she denies any discomfort, and does report that the low-dose nitroglycerin infusion that she is receiving has helped.     Of note, her initial blood pressure when presenting to [facility] this morning was 181/90.    Hospital Course  Patient was monitored on telemetry with no obvious arrhythmias noted.  She continued to have intermittent ill described chest discomfort, though history is obviously difficult to obtain given her speech difficulties.  Troponins were minimally elevated but peaked at 0.17.  Since LFTs and alkaline phosphatase are also minimally elevated on arrival, GI  work-up was performed initially with abdominal ultrasound not showing any obvious pathology.  Without any other expiration for symptoms, she was taken to Cath Lab on 9/2/2019, but found to have only nonobstructive coronary disease.  While on the table during the procedure, prior to being sedated, she was noticed to go from her normal sinus rhythm with a right bundle branch block to development of an acute wide-complex tachycardia with rates of approximately 160 bpm.  She denied any symptoms at this moment time, but the same by complex tachycardia recurred again when injecting the coronaries on 2 occasions.  This had the look a pacemaker mediated tachycardia.  When questioned further after the procedure, she does admit the palpitations typically preceded the development of her chest discomfort, with the discomfort and lasting for about 5 more minutes.  At this point, high risk conditions have been excluded she is safe for discharge after completing groin rest.  I have asked that she submit a pacemaker transmission from home tomorrow to our office to see if in fact she is having pacemaker mediated tachycardia, and if so, people can be increased to take care of this.  If this is not PMT, but she is having short runs of tachycardia, she can be started on a beta-blocker.    Procedures Performed  Procedure(s):  Cardiac Catheterization/Vascular Study  09/02 1015 Note By: Ryland Lopes MD    Consults:   Consults     No orders found from 8/2/2019 to 9/1/2019.          Pertinent Test Results:  Results for orders placed during the hospital encounter of 08/31/19   Adult Transthoracic Echo Complete W/ Cont if Necessary Per Protocol    Narrative · Left ventricular systolic function is normal. LVEF 61-65%.  · Moderate aortic valve regurgitation is present.  · Left ventricular wall thickness is consistent with mild concentric   hypertrophy.  · Left ventricular diastolic dysfunction (grade I) consistent with   impaired  relaxation.  · Left atrial cavity size is moderately dilated.  · Mild-to-moderate pulmonic valve regurgitation is present.  · Normal size and function of the right ventricle.          CATH: non-obstructive CAD    Condition on Discharge:  stable    Physical Exam at Discharge    Vital Signs  Temp:  [97.4 °F (36.3 °C)-97.9 °F (36.6 °C)] 97.9 °F (36.6 °C)  Heart Rate:  [68-99] 96  Resp:  [17-20] 17  BP: (123-186)/() 138/101    Physical Exam:  Physical Exam   Constitutional: No distress.   Neck: No JVD present.   Cardiovascular: Normal rate, regular rhythm, S1 normal, S2 normal, normal heart sounds, intact distal pulses and normal pulses.   Pulmonary/Chest: Effort normal and breath sounds normal.   Abdominal: Soft. There is no tenderness.   Neurological: She is alert and oriented to person, place, and time.   Skin: Skin is warm and dry.       Discharge Disposition  Home or Self Care    Discharge Medications     Discharge Medications      Continue These Medications      Instructions Start Date   aspirin 81 MG chewable tablet   81 mg, Oral, Daily      atorvastatin 80 MG tablet  Commonly known as:  LIPITOR   80 mg, Oral, Nightly      carbidopa-levodopa  MG per tablet  Commonly known as:  SINEMET   1.5 tablets, Oral, 2 Times Daily With Meals      cholecalciferol 1000 units tablet  Commonly known as:  VITAMIN D3   1,000 Units, Oral, 2 Times Daily      cyclobenzaprine 10 MG tablet  Commonly known as:  FLEXERIL   10 mg, Oral, 3 Times Daily PRN      folic acid 400 MCG tablet  Commonly known as:  FOLVITE   400 mcg, Oral, Daily      hydroxychloroquine 200 MG tablet  Commonly known as:  PLAQUENIL   200 mg, Oral, Daily With Breakfast      hydroxychloroquine 200 MG tablet  Commonly known as:  PLAQUENIL   100 mg, Oral, Nightly      methotrexate 2.5 MG tablet   10 mg, Oral, Weekly, On Saturdays      omeprazole 20 MG capsule  Commonly known as:  priLOSEC   20 mg, Oral, Daily      predniSONE 1 MG tablet  Commonly known as:   DELTASONE   2 mg, Oral, 2 Times Daily      raloxifene 60 MG tablet  Commonly known as:  EVISTA   60 mg, Oral, Daily      WOMENS ONE DAILY PO   1 tablet, Oral             Discharge Diet: cardiac    Activity at Discharge:    No heavy lifting for 5-7 days greater than 10 pounds.  No heavy or strenuous pushing or pulling.  No tub baths, hot tubs, swimming pools, or submerging groin underwater for 1 week.  Wash groin site daily with antibacterial soap and water. Rinse and keep clean and dry.  No lotions, powders, or topical ointments to site. Keep open to air and dry.  Call our office with any concerns or if you notice redness, swelling, drainage, warmth, or pain at the site.      Follow-up Appointments  -as planned (see below)  Future Appointments   Date Time Provider Department Center   9/10/2019 11:15 AM PACEMAKER HEART [facility]   10/30/2019 11:15 AM Christiano Jordan MD [facility]         Test Results Pending at Discharge: none       I personally participated in the discharge process for 35 minutes    Ryland Lopes MD  09/02/19  11:37 AM

## 2019-09-02 NOTE — PROGRESS NOTES
Discharge Planning Assessment  [facility]     Patient Name: Kristen Mittal  MRN: [id number]  Today's Date: 9/2/2019    Admit Date: 8/31/2019    Discharge Needs Assessment     Row Name 09/02/19 0924       Living Environment    Lives With  sibling(s)    Name(s) of Who Lives With Patient  Sosa Shah    Current Living Arrangements  home/apartment/condo    Primary Care Provided by  self    Provides Primary Care For  no one    Family Caregiver if Needed  sibling(s)    Quality of Family Relationships  helpful;involved;supportive    Able to Return to Prior Arrangements  yes       Resource/Environmental Concerns    Resource/Environmental Concerns  none       Transition Planning    Patient/Family Anticipates Transition to  home with family    Transportation Anticipated  family or friend will provide       Discharge Needs Assessment    Readmission Within the Last 30 Days  no previous admission in last 30 days    Concerns to be Addressed  no discharge needs identified    Equipment Currently Used at Home  cane, straight    Anticipated Changes Related to Illness  none    Equipment Needed After Discharge  none    Current Discharge Risk  chronically ill    Discharge Coordination/Progress  Pt has PCP and RX coverage.  Pt can afford medications.  Pt denies any dc needs.  SW will follow.          Discharge Plan    No documentation.       Destination      No service coordination in this encounter.      Durable Medical Equipment      No service coordination in this encounter.      Dialysis/Infusion      No service coordination in this encounter.      Home Medical Care      No service coordination in this encounter.      Therapy      No service coordination in this encounter.      Community Resources      No service coordination in this encounter.          Demographic Summary    No documentation.       Functional Status    No documentation.       Psychosocial    No documentation.       Abuse/Neglect    No documentation.       Legal     No documentation.       Substance Abuse    No documentation.       Patient Forms    No documentation.           DEBORAH MckayW

## 2019-09-03 ENCOUNTER — TELEPHONE (OUTPATIENT)
Dept: CARDIOLOGY | Facility: CLINIC | Age: 74
End: 2019-09-03

## 2019-09-03 ENCOUNTER — READMISSION MANAGEMENT (OUTPATIENT)
Dept: CALL CENTER | Facility: HOSPITAL | Age: 74
End: 2019-09-03

## 2019-09-03 NOTE — TELEPHONE ENCOUNTER
----- Message from Ryland Lopes MD sent at 9/2/2019 11:41 AM CDT -----  Please contact this patient and make sure that she knows how to send transmissions from home.  On the hospital, especially on the Cath Lab table, she had short-lived episodes of tachycardia that looked possibly consistent with PMT.  I asked that she send a transmission so we could tell - but she wasn't sure that she knew how to do that.   [facility], [facility]  Nephrology Daily Progress Note    Meir Hutchins  [id number]  78year old  Patient presents with:  Test Results      HPI:   Meri Hutchins is a 78year old female.   78-year-old female with a history of chronic atrial fibrillation, mi ONLY 11/13/2018 11/20/2018 2/19/2019   Weight 141 lbs 13 oz 142 lbs 139 lbs 3 oz       Constitutional: appears well hydrated alert and responsive no acute distress noted  Neck/Thyroid: neck is supple without adenopathy  Lymphatic: no abnormal cervical, sup Calcium 500 MG Oral Tab, Take 500 mg by mouth 2 (two) times daily.   , Disp: , Rfl:   •  Warfarin Sodium 2.5 MG Oral Tab, Take 1/2 tablet (1.25 mg) 3 days and 1 tablet (2.5 mg ) 4 days, Disp: 90 tablet, Rfl: 0    Allergies:  No Known Allergies         ASSES

## 2019-09-03 NOTE — TELEPHONE ENCOUNTER
Remote transmission received that did indicate PMT; however, no EGMs saved.  Patient has in office appointment on 9/10/2019, during which RN will further assess PMT and adjust device accordingly.  Patient's phone number is invalid and RN was unable to reach patient.

## 2019-09-03 NOTE — OUTREACH NOTE
Prep Survey      Responses   Facility patient discharged from?  [place]   Is patient eligible?  Yes   Discharge diagnosis  NSTEMI   Does the patient have one of the following disease processes/diagnoses(primary or secondary)?  Acute MI (STEMI,NSTEMI)   Does the patient have Home health ordered?  No   Is there a DME ordered?  No   Comments regarding appointments  see AVS   Prep survey completed?  Yes          Tabby Kulkarni RN

## 2019-09-04 ENCOUNTER — READMISSION MANAGEMENT (OUTPATIENT)
Dept: CALL CENTER | Facility: HOSPITAL | Age: 74
End: 2019-09-04

## 2019-09-06 ENCOUNTER — READMISSION MANAGEMENT (OUTPATIENT)
Dept: CALL CENTER | Facility: HOSPITAL | Age: 74
End: 2019-09-06

## 2019-09-06 NOTE — OUTREACH NOTE
AMI Week 1 Survey      Responses   Facility patient discharged from?  [place]   Does the patient have one of the following disease processes/diagnoses(primary or secondary)?  Acute MI (STEMI,NSTEMI)   Is there a successful TCM telephone encounter documented?  No   Week 1 attempt successful?  Yes   Call start time  1417   Call end time  1423   Discharge diagnosis  NSTEMI, heart cath   Is patient permission given to speak with other caregiver?  Yes   List who call center can speak with  Alyssa cedillo   Person spoke with today (if not patient) and relationship  Spoke to sister and patient   Medication alerts for this patient  NO changes made to medications this admission.    Is the patient taking all medications as directed (includes completed medication regime)?  Yes   Does the patient have a primary care provider?   Yes   Does the patient have an appointment with their PCP,cardiologist,or clinic within 7 days of discharge?  No   Comments regarding PCP  Amadou Rowan MD    Nursing Interventions  Educated patient on importance of making appointment   Has the patient kept scheduled appointments due by today?  N/A   Comments  PACEMAKER IN OFFICE appt, Tuesday Sep 10, 2019 11:15 AM    Has home health visited the patient within 72 hours of discharge?  N/A   Psychosocial issues?  No   Did the patient receive a copy of their discharge instructions?  Yes   Nursing interventions  Reviewed instructions with patient   What is the patient's perception of their health status since discharge?  Improving   Is the patient/caregiver able to teach back ways to prevent a second heart attack:  Take medications, Follow up with MD   Is the patient/caregiver able to teach back the hierarchy of who to call/visit for symptoms/problems? PCP, Specialist, Home health nurse, Urgent Care, ED, 911  Yes   Week 1 call completed?  Yes          Kristen Macias RN

## 2019-09-10 ENCOUNTER — CLINICAL SUPPORT NO REQUIREMENTS (OUTPATIENT)
Dept: CARDIOLOGY | Facility: CLINIC | Age: 74
End: 2019-09-10

## 2019-09-10 DIAGNOSIS — I44.1 SECOND DEGREE AV BLOCK, MOBITZ TYPE II: ICD-10-CM

## 2019-09-10 DIAGNOSIS — Z95.0 PACEMAKER: Primary | ICD-10-CM

## 2019-09-10 PROCEDURE — 93280 PM DEVICE PROGR EVAL DUAL: CPT | Performed by: INTERNAL MEDICINE

## 2019-09-10 NOTE — PROGRESS NOTES
Dual Chamber Pacemaker Evaluation Report  IN OFFICE INTERROGATION BY COMPANY DEVICE REP    September 10, 2019    Primary Cardiologist: Nikki  : [employer] Model: Raquel MRI 2272 Pacemaker  Implant date: 7/29/2019    Reason for evaluation: new implant follow up  Indication for pacemaker: complete heart block    Measurements  Atrial sensing - P wave: 3.1 mV  Atrial threshold: 1.25V@ 0.4ms  Atrial lead impedance: 860 ohms  Ventricular sensing - R wave: >12 mV  Ventricular threshold: 1 V @ 0.4 ms  Ventricular lead impedance:   490 ohms     Diagnostic Data  Atrial paced: 5.3 %  Ventricular paced: 2.9 %    Episodes recorded since 7/29/2019:  P-AFl, 120 episodes:  Longest duration available 22.5 minutes.  Ventricular rates controlled.  [place] <1%.  Patient is not anticoagulated.  5 PMT episodes noted but no EGMs recorded.  Turned PMT EGMs on to capture future episodes.  Unable to recreate PMT with testing.  Will assess future EGMs and will reprogram appropriately if needed.    Incision:  Healed.  Well approximated without erythema, edema, warmth, open areas, or drainage of any kind.  Denies fevers since time of implant    Battery status: satisfactory   3.04V, estimated 10.9-11.7 years remaining      Final Parameters  Mode:  DDD  Lower rate: 60 bpm   Upper rate: 130 bpm  AV Delay: paced- 200 ms  Sensed-150 ms  Atrial - Amplitude: 2.25 V   Pulse width: 0.4 ms   Sensitivity: Auto mV     Ventricular - Amplitude: 1.125 V  Pulse width: 0.4 ms  Sensitivity: Auto mV    Changes made: PMT episode trigger changed from OFF to LOW.  Conclusions: normal pacemaker function, stable pacing and sensing thresholds, adequate battery reserve and atrial flutter is new    Follow up: Every 3 months via [facility], annually in office (9/15/2020)     Interrogation performed by Rafael Pillai [employer] Device Rep

## 2019-09-13 ENCOUNTER — READMISSION MANAGEMENT (OUTPATIENT)
Dept: CALL CENTER | Facility: HOSPITAL | Age: 74
End: 2019-09-13

## 2019-09-13 NOTE — OUTREACH NOTE
AMI Week 2 Survey      Responses   Facility patient discharged from?  [place]   Does the patient have one of the following disease processes/diagnoses(primary or secondary)?  Acute MI (STEMI,NSTEMI)   Week 2 attempt successful?  Yes   Call start time  1210   Rescheduled  Rescheduled-pt requested   Discharge diagnosis  NSTEMI, heart cath   Is patient permission given to speak with other caregiver?  Yes   List who call center can speak with  sister, Alyssa Alcala RN

## 2019-09-16 ENCOUNTER — READMISSION MANAGEMENT (OUTPATIENT)
Dept: CALL CENTER | Facility: HOSPITAL | Age: 74
End: 2019-09-16

## 2019-09-16 NOTE — OUTREACH NOTE
"AMI Week 2 Survey      Responses   Facility patient discharged from?  [place]   Does the patient have one of the following disease processes/diagnoses(primary or secondary)?  Acute MI (STEMI,NSTEMI)   Week 2 attempt successful?  Yes   Call start time  1506   Call end time  1510   Discharge diagnosis  NSTEMI, heart cath   Is patient permission given to speak with other caregiver?  Yes   List who call center can speak with  , Alyssa   Person spoke with today (if not patient) and relationship  Spoke to sister and patient   Meds reviewed with patient/caregiver?  Yes   Is the patient having any side effects they believe may be caused by any medication additions or changes?  No   Does the patient have all prescriptions related to this admission filled (includes statins,anticoagulants,HTN meds,anti-arrhythmia meds)  Yes   Is the patient taking all medications as directed (includes completed medication regime)?  Yes   Does the patient have a primary care provider?   Yes   Comments regarding PCP  Pt has seen Dr. Harpal MD   Has the patient kept scheduled appointments due by today?  Yes   What is the Home health agency?   Pt is \"back on  care\" and these nurses help her with medication   Psychosocial issues?  No   Did the patient receive a copy of their discharge instructions?  Yes   Nursing interventions  Reviewed instructions with patient   What is the patient's perception of their health status since discharge?  Improving   Is the pateint /caregiver able to teach back the importance of cardiac rehab?  Yes   Is the patient/caregiver able to teach back the hierarchy of who to call/visit for symptoms/problems? PCP, Specialist, Home health nurse, Urgent Care, ED, 911  Yes   Week 2 call completed?  Yes   Wrap up additional comments  Spoke with sister who is in nursing home on short term basis.  We had a dropped call and unable to reconnect.          Holly Shea RN  "

## 2019-09-19 NOTE — THERAPY DISCHARGE NOTE
Acute Care - Speech Language Pathology Discharge Summary  [facility]       Patient Name: Kristen Mittal  : 1945  MRN: [id number]    Today's Date: 2019  Onset of Illness/Injury or Date of Surgery: 19       Referring Physician: Dr. Lopes        Admit Date: 2019      SLP Recommendation and Plan  Mechanical soft solids and thin liquids    Visit Dx:    ICD-10-CM ICD-9-CM   1. Non-STEMI (non-ST elevated myocardial infarction) (CMS/[facility]) I21.4 410.70   2. Oropharyngeal dysphagia R13.12 787.22               SLP GOALS     Row Name 19 1300             Oral Nutrition/Hydration Goal 1 (SLP)    Oral Nutrition/Hydration Goal 1, SLP  LTG: Patient will tolerate LRD with no overt s/s of aspiration.  -MB      Time Frame (Oral Nutrition/Hydration Goal 1, SLP)  by discharge  -MB      Barriers (Oral Nutrition/Hydration Goal 1, SLP)  n/a  -MB      Progress/Outcomes (Oral Nutrition/Hydration Goal 1, SLP)  goal not met  -MB         Swallow Compensatory Strategies Goal 1 (SLP)    Activity (Swallow Compensatory Strategies/Techniques Goal 1, SLP)  compensatory strategies;small bites;small straw sips;alternate food/liquid intake;other (see comments)  -MB      Kay/Accuracy (Swallow Compensatory Strategies/Techniques Goal 1, SLP)  independently (over 90% accuracy)  -MB      Time Frame (Swallow Compensatory Strategies/Techniques Goal 1, SLP)  short term goal (STG);by discharge  -MB      Barriers (Swallow Compensatory Strategies/Techniques Goal 1, SLP)  n/a  -MB      Progress/Outcomes (Swallow Compensatory Strategies/Techniques Goal 1, SLP)  goal not met  -MB        User Key  (r) = Recorded By, (t) = Taken By, (c) = Cosigned By    Initials Name Provider Type    Stew Alvarez CCC-SLP Speech and Language Pathologist                  SLP Discharge Summary  Anticipated Dischage Disposition: home  Reason for Discharge: discharge from this facility  Progress Toward Achieving Short/long Term Goals: goals  not met within established timelines  Discharge Destination: home      Stew Meehan CCC-SLP  9/19/2019

## 2019-09-25 ENCOUNTER — READMISSION MANAGEMENT (OUTPATIENT)
Dept: CALL CENTER | Facility: HOSPITAL | Age: 74
End: 2019-09-25

## 2019-09-25 ENCOUNTER — TELEPHONE (OUTPATIENT)
Dept: CARDIOLOGY | Facility: CLINIC | Age: 74
End: 2019-09-25

## 2019-09-25 NOTE — OUTREACH NOTE
AMI Week 3 Survey      Responses   Facility patient discharged from?  [facility]   Does the patient have one of the following disease processes/diagnoses(primary or secondary)?  Acute MI (STEMI,NSTEMI)   Week 3 attempt successful?  Yes   Call start time  1017   Call end time  1024   Discharge diagnosis  NSTEMI, heart cath   Is patient permission given to speak with other caregiver?  Yes   List who call center can speak with  sister, Alyssa   Person spoke with today (if not patient) and relationship  Spoke to sister and patient   Meds reviewed with patient/caregiver?  No [Patient and sister in car on their way to Dr espinoza. ]   Is the patient taking all medications as directed (includes completed medication regime)?  Yes   Does the patient have a primary care provider?   Yes   Comments regarding PCP  Pt has seen Dr. Harpal MD   Has the patient kept scheduled appointments due by today?  Yes   Comments  Sister reports that she is taking patient to see her arthritis Dr today.   Psychosocial issues?  Yes   Psychosocial comments  Sister states that patients daughter recently passed away.    Did the patient receive a copy of their discharge instructions?  Yes   What is the patient's perception of their health status since discharge?  Improving   Is the patient/caregiver able to teach back signs and symptoms of when to call for help immediately:  Sudden chest discomfort, Sudden discomfort in arms, back, neck or jaw, Shortness of breath at any time, Sudden sweating or clammy skin, Nausea or vomiting, Dizziness or lightheadedness, Irregular or rapid heart rate   Nursing interventions  Nurse provided patient education   Is the patient/caregiver able to teach back ways to prevent a second heart attack:  Take medications, Follow up with MD   Is the patient/caregiver able to teach back the hierarchy of who to call/visit for symptoms/problems? PCP, Specialist, Home health nurse, Urgent Care, ED, 911  Yes   Week 3 call completed?  Yes           Kristen Macias, RN

## 2019-10-04 ENCOUNTER — READMISSION MANAGEMENT (OUTPATIENT)
Dept: CALL CENTER | Facility: HOSPITAL | Age: 74
End: 2019-10-04

## 2019-10-04 NOTE — OUTREACH NOTE
AMI Week 4 Survey      Responses   Facility patient discharged from?  [facility]   Does the patient have one of the following disease processes/diagnoses(primary or secondary)?  Acute MI (STEMI,NSTEMI)   Week 4 attempt successful?  No          Meghan Griggs RN

## 2019-10-05 ENCOUNTER — HOSPITAL ENCOUNTER (EMERGENCY)
Facility: HOSPITAL | Age: 74
Discharge: HOME | End: 2019-10-05
Admitting: EMERGENCY MEDICINE

## 2019-10-05 ENCOUNTER — APPOINTMENT (OUTPATIENT)
Dept: CT IMAGING | Facility: HOSPITAL | Age: 74
End: 2019-10-05

## 2019-10-05 ENCOUNTER — APPOINTMENT (OUTPATIENT)
Dept: GENERAL RADIOLOGY | Facility: HOSPITAL | Age: 74
End: 2019-10-05

## 2019-10-05 VITALS
RESPIRATION RATE: 15 BRPM | SYSTOLIC BLOOD PRESSURE: 133 MMHG | TEMPERATURE: 96.3 F | HEIGHT: 62 IN | BODY MASS INDEX: 18.22 KG/M2 | HEART RATE: 111 BPM | DIASTOLIC BLOOD PRESSURE: 83 MMHG | OXYGEN SATURATION: 96 % | WEIGHT: 99 LBS

## 2019-10-05 DIAGNOSIS — I48.92 ATRIAL FLUTTER, UNSPECIFIED TYPE (HCC): Primary | ICD-10-CM

## 2019-10-05 LAB
ALBUMIN SERPL-MCNC: 3.6 G/DL (ref 3.5–5.2)
ALBUMIN/GLOB SERPL: 1.4 G/DL
ALP SERPL-CCNC: 130 U/L (ref 39–117)
ALT SERPL W P-5'-P-CCNC: 68 U/L (ref 1–33)
ANION GAP SERPL CALCULATED.3IONS-SCNC: 10 MMOL/L (ref 5–15)
AST SERPL-CCNC: 38 U/L (ref 1–32)
BASOPHILS # BLD AUTO: 0.04 10*3/MM3 (ref 0–0.2)
BASOPHILS NFR BLD AUTO: 0.5 % (ref 0–1.5)
BILIRUB SERPL-MCNC: 0.4 MG/DL (ref 0.2–1.2)
BUN BLD-MCNC: 15 MG/DL (ref 8–23)
BUN/CREAT SERPL: 23.1 (ref 7–25)
CALCIUM SPEC-SCNC: 8.5 MG/DL (ref 8.6–10.5)
CHLORIDE SERPL-SCNC: 101 MMOL/L (ref 98–107)
CO2 SERPL-SCNC: 29 MMOL/L (ref 22–29)
CREAT BLD-MCNC: 0.65 MG/DL (ref 0.57–1)
D DIMER PPP FEU-MCNC: 0.89 MG/L (FEU) (ref 0–0.5)
DEPRECATED RDW RBC AUTO: 47.8 FL (ref 37–54)
EOSINOPHIL # BLD AUTO: 0.05 10*3/MM3 (ref 0–0.4)
EOSINOPHIL NFR BLD AUTO: 0.7 % (ref 0.3–6.2)
ERYTHROCYTE [DISTWIDTH] IN BLOOD BY AUTOMATED COUNT: 13 % (ref 12.3–15.4)
GFR SERPL CREATININE-BSD FRML MDRD: 89 ML/MIN/1.73
GLOBULIN UR ELPH-MCNC: 2.6 GM/DL
GLUCOSE BLD-MCNC: 93 MG/DL (ref 65–99)
HCT VFR BLD AUTO: 37.3 % (ref 34–46.6)
HGB BLD-MCNC: 12.6 G/DL (ref 12–15.9)
IMM GRANULOCYTES # BLD AUTO: 0.03 10*3/MM3 (ref 0–0.05)
IMM GRANULOCYTES NFR BLD AUTO: 0.4 % (ref 0–0.5)
LYMPHOCYTES # BLD AUTO: 1.4 10*3/MM3 (ref 0.7–3.1)
LYMPHOCYTES NFR BLD AUTO: 19.2 % (ref 19.6–45.3)
MCH RBC QN AUTO: 33.9 PG (ref 26.6–33)
MCHC RBC AUTO-ENTMCNC: 33.8 G/DL (ref 31.5–35.7)
MCV RBC AUTO: 100.3 FL (ref 79–97)
MONOCYTES # BLD AUTO: 0.75 10*3/MM3 (ref 0.1–0.9)
MONOCYTES NFR BLD AUTO: 10.3 % (ref 5–12)
NEUTROPHILS # BLD AUTO: 5.01 10*3/MM3 (ref 1.7–7)
NEUTROPHILS NFR BLD AUTO: 68.9 % (ref 42.7–76)
NRBC BLD AUTO-RTO: 0 /100 WBC (ref 0–0.2)
NT-PROBNP SERPL-MCNC: 1767 PG/ML (ref 5–1800)
PLATELET # BLD AUTO: 198 10*3/MM3 (ref 140–450)
PMV BLD AUTO: 9.6 FL (ref 6–12)
POTASSIUM BLD-SCNC: 4.5 MMOL/L (ref 3.5–5.2)
PROT SERPL-MCNC: 6.2 G/DL (ref 6–8.5)
RBC # BLD AUTO: 3.72 10*6/MM3 (ref 3.77–5.28)
SODIUM BLD-SCNC: 140 MMOL/L (ref 136–145)
T3FREE SERPL-MCNC: 2.42 PG/ML (ref 2–4.4)
T4 FREE SERPL-MCNC: 1.18 NG/DL (ref 0.93–1.7)
TROPONIN T SERPL-MCNC: 0.01 NG/ML (ref 0–0.03)
TROPONIN T SERPL-MCNC: 0.02 NG/ML (ref 0–0.03)
TSH SERPL DL<=0.05 MIU/L-ACNC: 1.27 UIU/ML (ref 0.27–4.2)
WBC NRBC COR # BLD: 7.28 10*3/MM3 (ref 3.4–10.8)

## 2019-10-05 PROCEDURE — 93005 ELECTROCARDIOGRAM TRACING: CPT | Performed by: FAMILY MEDICINE

## 2019-10-05 PROCEDURE — 93005 ELECTROCARDIOGRAM TRACING: CPT | Performed by: PHYSICIAN ASSISTANT

## 2019-10-05 PROCEDURE — 84439 ASSAY OF FREE THYROXINE: CPT | Performed by: PHYSICIAN ASSISTANT

## 2019-10-05 PROCEDURE — 93010 ELECTROCARDIOGRAM REPORT: CPT | Performed by: INTERNAL MEDICINE

## 2019-10-05 PROCEDURE — 0 IOPAMIDOL PER 1 ML: Performed by: PHYSICIAN ASSISTANT

## 2019-10-05 PROCEDURE — 85379 FIBRIN DEGRADATION QUANT: CPT | Performed by: PHYSICIAN ASSISTANT

## 2019-10-05 PROCEDURE — 71045 X-RAY EXAM CHEST 1 VIEW: CPT

## 2019-10-05 PROCEDURE — 80053 COMPREHEN METABOLIC PANEL: CPT | Performed by: PHYSICIAN ASSISTANT

## 2019-10-05 PROCEDURE — 83880 ASSAY OF NATRIURETIC PEPTIDE: CPT | Performed by: PHYSICIAN ASSISTANT

## 2019-10-05 PROCEDURE — 85025 COMPLETE CBC W/AUTO DIFF WBC: CPT | Performed by: PHYSICIAN ASSISTANT

## 2019-10-05 PROCEDURE — 84443 ASSAY THYROID STIM HORMONE: CPT | Performed by: PHYSICIAN ASSISTANT

## 2019-10-05 PROCEDURE — 84484 ASSAY OF TROPONIN QUANT: CPT | Performed by: PHYSICIAN ASSISTANT

## 2019-10-05 PROCEDURE — 84481 FREE ASSAY (FT-3): CPT | Performed by: PHYSICIAN ASSISTANT

## 2019-10-05 PROCEDURE — 99285 EMERGENCY DEPT VISIT HI MDM: CPT

## 2019-10-05 PROCEDURE — 71275 CT ANGIOGRAPHY CHEST: CPT

## 2019-10-05 RX ORDER — NITROGLYCERIN 0.4 MG/1
0.4 TABLET SUBLINGUAL
Status: DISCONTINUED | OUTPATIENT
Start: 2019-10-05 | End: 2019-10-05 | Stop reason: HOSPADM

## 2019-10-05 RX ORDER — SODIUM CHLORIDE 0.9 % (FLUSH) 0.9 %
10 SYRINGE (ML) INJECTION AS NEEDED
Status: DISCONTINUED | OUTPATIENT
Start: 2019-10-05 | End: 2019-10-05 | Stop reason: HOSPADM

## 2019-10-05 RX ADMIN — NITROGLYCERIN 0.4 MG: 0.4 TABLET, ORALLY DISINTEGRATING SUBLINGUAL at 16:27

## 2019-10-05 RX ADMIN — IOPAMIDOL 100 ML: 755 INJECTION, SOLUTION INTRAVENOUS at 13:47

## 2019-10-05 NOTE — ED PROVIDER NOTES
"Subjective   History of Present Illness  Pt is a pleasant 75 yo female w/ hx of CAD and parkinsons who presents with chest pain. Pt states that upon waking at about 5:30 she had a sudden mid-sternal chest pressure that lasted about 30 minutes, following the chest pain she had a brief period where should could not take a deep breath. Patient said her symptoms all resolved after about 30 minutes and she went about her day as normal even having her typical breakfast. The patient states she did not want EMS transport, but was instructed to do so by her family member. Patient did not take anything for her chest pain this morning other than her daily prescribed 81mg aspirin She does not think anything was given en route. She is currently pain free resting in bed. She denies any associated nausea, vomiting, lightheadedness, diaphoresis, headache, radiation of pain, dysuria, bowel dysfunction, changes in vision, weight change, LE swelling.    Pt has a hx of a cath on 19 for an NSTEMI. Patient states the she has a pacemaker currently in place for what she describes as her \"heart going out of rhythm\" but  Has no knowledge of atrial fibrillation and is not on anticoagulants. Patient states her only other pains are an intermittent mild pain in her chest over the pacemaker site, and her back.                    Kristen Mittal   Cardiac Catheterization/Vascular Study   Order# [id number]   Reading physician: Ryland Lopes MD Ordering physician: Jazmine Watkins APRN Study date: 19    Procedures     Cardiac Catheterization/Vascular Study      Patient Information     Patient Name  Kristen Mittal MRN  [id number] Sex  Female  (Age)  1945 (74 y.o.)   Race Ethnicity Encounter Category   White or  Not  or  Emergency   Patient Hx Of Height, Weight, and Vitals     Height Weight BSA (Calculated - sq m) BMI (kg/m2) Pulse BP       96 138/101   Physicians     Panel Physicians Referring Physician Case " Authorizing Physician   Ryland Lopes MD (Primary) Ryland Lopes MD Reed, Samantha L, APRN   Indications     Non-STEMI (non-ST elevated myocardial infarction) (CMS/[facility]) [I21.4 (ICD-10-CM)]   Pre-procedure Diagnosis     elevated troponin       Conclusion       [facility]  Date of procedure: 9/2/2019     Procedures performed:     1. Selective coronary angiography  2. Left heart catheterization  3. Left ventriculography  4. Supervision of the administration of moderate sedation     Risk, Benefits, and Alternatives discussed with the patient and/or family.  Plan is for moderate sedation, and the patient agrees to proceed with the procedure.  An immediate assessment was done prior to the administration of moderate sedation      Indication: Non-STEMI  Premedication: Versed, Fentanyl  Contrast: Isovue 370 -80 ml to patient  Radiation: Flouro time= 1: 56. Air Kerma= 213.22 mGy  Catheters: 6Fr JL4, 6Fr JR4, 6Fr angled pigtail     Procedural details:  The patient was brought to the cath lab and prepped and draped in the usual fashion. A Seldinger technique was used to place a 6 Fr sheath in the right common femoral artery. A 6 Fr JL4 catheter was used to engage the left main coronary artery for left system angiography. That was exchanged for a 6 Fr JR4 catheter which was engaged in the ostium of the right coronary artery for right system angiography.  6 Kazakh angled pigtail was advanced over across the valve and used to perform left ventriculography as well as assess left ventricular pressures.  Femoral angiography showed the arteriotomy to be suitable for closure.  The catheter and sheath were then removed and arterial hemostasis was obtained using 6 Kazakh Perclose. There were no obvious complications and the patient was transferred to the telemetry floor in hemodynamically stable condition.     I supervised the administration of conscious sedation by nursing staff throughout the case.  First  "dose was given at 0955 and the end of my face-to-face encounter was at 1015, accounting for a total of 20 minutes of supervision.  During the case, continuous pulse oximetry, heart rate, blood pressure, and patient status were monitored.      Findings:     Hemodynamics:  Ao= 155/53, mean 96, LV= 155, LVEDP= 13, AV grad= none  Rhythm: Patient's predominant rhythm throughout the case was normal sinus rhythm with rate in the 70s and right bundle branch morphology.  She did, on several occasions, developed an acute onset of wide-complex tachycardia that appeared to be a fast paced rhythm with rate of approximately 160 bpm.  This rhythm would also self terminate in a matter of seconds.     Left ventriculography:  1. The contractility of the left ventricle is normal.  Estimated ejection fraction greater than 65 %.  2. The left ventricle is normal in wall thickness and chamber size.  3. There is no significant mitral regurgitation or aortic insufficiency.         Review of Systems   Constitutional: Negative.    Respiratory: Positive for chest tightness and shortness of breath.    Cardiovascular: Positive for chest pain. Negative for leg swelling.   Gastrointestinal: Negative.    Genitourinary: Negative.    Musculoskeletal: Negative.    Neurological: Negative.        Past Medical History:   Diagnosis Date   • CHF (congestive heart failure) (CMS/HCC)    • COPD (chronic obstructive pulmonary disease) (CMS/HCC)    • GERD (gastroesophageal reflux disease)    • Osteoporosis    • Parkinson's disease (CMS/HCC)    • Rheumatoid arthritis (CMS/HCC)    • Speech abnormality     Pt states \"they dont know why my speech is like this but it has been that way for a long time.\"   • Stroke (CMS/HCC)     Pt states she has not had a stroke and doesnt know why its listed.       No Known Allergies    Past Surgical History:   Procedure Laterality Date   • CARDIAC CATHETERIZATION Left 9/2/2019    Procedure: Cardiac Catheterization/Vascular Study;  " Surgeon: Ryland Lopes MD;  Location:  [facility] CATH INVASIVE LOCATION;  Service: Cardiology   • CARDIAC ELECTROPHYSIOLOGY PROCEDURE N/A 7/29/2019    Procedure: Pacemaker SC new;  Surgeon: Christiano Jordan MD;  Location:  [facility] CATH [facility] LOC[facility];  Service: Cardiology       History reviewed. No pertinent family history.    Social History     Socioeconomic History   • Marital status:      Spouse name: Not on file   • Number of children: Not on file   • Years of education: Not on file   • Highest education level: Not on file   Tobacco Use   • Smoking status: Never Smoker   • Smokeless tobacco: Never Used   Substance and Sexual Activity   • Alcohol use: No   • Drug use: No   • Sexual activity: Defer       Prior to Admission medications    Medication Sig Start Date End Date Taking? Authorizing Provider   aspirin 81 MG chewable tablet Chew 81 mg Daily.    Sae Jain MD   atorvastatin (LIPITOR) 80 MG tablet Take 1 tablet by mouth Every Night. 10/21/18   Maame Morales PA-C   carbidopa-levodopa (SINEMET)  MG per tablet Take 1.5 tablets by mouth 2 (Two) Times a Day With Meals.    Sae Jain MD   cholecalciferol (VITAMIN D3) 1000 units tablet Take 1,000 Units by mouth 2 (Two) Times a Day.    Sae Jain MD   cyclobenzaprine (FLEXERIL) 10 MG tablet Take 10 mg by mouth 3 (Three) Times a Day As Needed for Muscle Spasms.    Sae Jain MD   folic acid (FOLVITE) 400 MCG tablet Take 400 mcg by mouth Daily.    Sae Jain MD   hydroxychloroquine (PLAQUENIL) 200 MG tablet Take 200 mg by mouth Daily With Breakfast.    Sae Jain MD   hydroxychloroquine (PLAQUENIL) 200 MG tablet Take 100 mg by mouth Every Night.    Sae Jain MD   methotrexate 2.5 MG tablet Take 10 mg by mouth 1 (One) Time Per Week. On Saturdays    Sae Jain MD   Multiple Vitamins-Minerals (WOMENS ONE DAILY PO) Take 1 tablet by mouth.    Susy  "MD Sae   omeprazole (priLOSEC) 20 MG capsule Take 20 mg by mouth Daily.    Provider, MD Sae   predniSONE (DELTASONE) 1 MG tablet Take 2 mg by mouth 2 (Two) Times a Day.    ProviderSae MD   raloxifene (EVISTA) 60 MG tablet Take 60 mg by mouth Daily.    ProviderSae MD       Medications   sodium chloride 0.9 % flush 10 mL (not administered)   nitroglycerin (NITROSTAT) SL tablet 0.4 mg (not administered)   iopamidol (ISOVUE-370) 76 % injection 100 mL (100 mL Intravenous Given 10/5/19 1347)       /74   Pulse 105   Temp 96.3 °F (35.7 °C) (Axillary) Comment: patient refused oral temp  Resp 20   Ht 157.5 cm (62\")   Wt 44.9 kg (99 lb)   SpO2 97%   BMI 18.11 kg/m²       Objective   Physical Exam   Constitutional: She is oriented to person, place, and time. She appears well-developed and well-nourished. No distress.   HENT:   Head: Normocephalic and atraumatic.   Eyes: Conjunctivae and EOM are normal. Pupils are equal, round, and reactive to light.   Neck: Normal range of motion. Neck supple. No tracheal deviation present.   Cardiovascular: Normal rate, regular rhythm, normal heart sounds and intact distal pulses.   No murmur heard.  Pulmonary/Chest: Effort normal and breath sounds normal.   Abdominal: Soft. Bowel sounds are normal. She exhibits no distension and no mass. There is no tenderness. There is no rebound and no guarding.   Musculoskeletal: Normal range of motion. She exhibits no edema.   Neurological: She is alert and oriented to person, place, and time. She has normal reflexes.   Pt has generalized light tremors   Skin: Skin is warm and dry. Capillary refill takes less than 2 seconds. She is not diaphoretic.   Psychiatric: She has a normal mood and affect. Her behavior is normal. Judgment and thought content normal.   Nursing note and vitals reviewed.      Procedures         Lab Results (last 24 hours)     Procedure Component Value Units Date/Time    CBC & " Differential [[id number]] Collected:  10/05/19 1224    Specimen:  Blood Updated:  10/05/19 1241    Narrative:       The following orders were created for panel order CBC & Differential.  Procedure                               Abnormality         Status                     ---------                               -----------         ------                     CBC Auto Differential[[id number]]        Abnormal            Final result                 Please view results for these tests on the individual orders.    Comprehensive Metabolic Panel [[id number]]  (Abnormal) Collected:  10/05/19 1224    Specimen:  Blood Updated:  10/05/19 1306     Glucose 93 mg/dL      BUN 15 mg/dL      Creatinine 0.65 mg/dL      Sodium 140 mmol/L      Potassium 4.5 mmol/L      Chloride 101 mmol/L      CO2 29.0 mmol/L      Calcium 8.5 mg/dL      Total Protein 6.2 g/dL      Albumin 3.60 g/dL      ALT (SGPT) 68 U/L      AST (SGOT) 38 U/L      Comment: Specimen hemolyzed.  Results may be affected.        Alkaline Phosphatase 130 U/L      Total Bilirubin 0.4 mg/dL      eGFR Non African Amer 89 mL/min/1.73      Globulin 2.6 gm/dL      A/G Ratio 1.4 g/dL      BUN/Creatinine Ratio 23.1     Anion Gap 10.0 mmol/L     Narrative:       GFR Normal >60  Chronic Kidney Disease <60  Kidney Failure <15    Troponin [[id number]]  (Normal) Collected:  10/05/19 1224    Specimen:  Blood Updated:  10/05/19 1306     Troponin T 0.015 ng/mL     Narrative:       Troponin T Reference Range:  <= 0.03 ng/mL-   Negative for AMI  >0.03 ng/mL-     Abnormal for myocardial necrosis.  Clinicians would have to utilize clinical acumen, EKG, Troponin and serial changes to determine if it is an Acute Myocardial Infarction or myocardial injury due to an underlying chronic condition.     CBC Auto Differential [[id number]]  (Abnormal) Collected:  10/05/19 1224    Specimen:  Blood Updated:  10/05/19 1241     WBC 7.28 10*3/mm3      RBC 3.72 10*6/mm3      Hemoglobin 12.6 g/dL      Hematocrit  37.3 %      .3 fL      MCH 33.9 pg      MCHC 33.8 g/dL      RDW 13.0 %      RDW-SD 47.8 fl      MPV 9.6 fL      Platelets 198 10*3/mm3      Neutrophil % 68.9 %      Lymphocyte % 19.2 %      Monocyte % 10.3 %      Eosinophil % 0.7 %      Basophil % 0.5 %      Immature Grans % 0.4 %      Neutrophils, Absolute 5.01 10*3/mm3      Lymphocytes, Absolute 1.40 10*3/mm3      Monocytes, Absolute 0.75 10*3/mm3      Eosinophils, Absolute 0.05 10*3/mm3      Basophils, Absolute 0.04 10*3/mm3      Immature Grans, Absolute 0.03 10*3/mm3      nRBC 0.0 /100 WBC     BNP [[id number]]  (Normal) Collected:  10/05/19 1224    Specimen:  Blood Updated:  10/05/19 1305     proBNP 1,767.0 pg/mL     Narrative:       Among patients with dyspnea, NT-proBNP is highly sensitive for the detection of acute congestive heart failure. In addition NT-proBNP of <300 pg/ml effectively rules out acute congestive heart failure with 99% negative predictive value.    D-dimer, Quantitative [[id number]]  (Abnormal) Collected:  10/05/19 1224    Specimen:  Blood Updated:  10/05/19 1247     D-Dimer, Quantitative 0.89 mg/L (FEU)     Narrative:       Reference Range is 0-0.50 mg/L FEU. However, results <0.50 mg/L FEU tends to rule out DVT or PE. Results >0.50 mg/L FEU are not useful in predicting absence or presence of DVT or PE.    TSH [[id number]]  (Normal) Collected:  10/05/19 1224    Specimen:  Blood Updated:  10/05/19 1305     TSH 1.270 uIU/mL     T3, Free [[id number]] Collected:  10/05/19 1224    Specimen:  Blood Updated:  10/05/19 1232    T4, Free [[id number]]  (Normal) Collected:  10/05/19 1224    Specimen:  Blood Updated:  10/05/19 1305     Free T4 1.18 ng/dL     Troponin [[id number]]  (Normal) Collected:  10/05/19 1543    Specimen:  Blood Updated:  10/05/19 1609     Troponin T 0.020 ng/mL     Narrative:       Troponin T Reference Range:  <= 0.03 ng/mL-   Negative for AMI  >0.03 ng/mL-     Abnormal for myocardial necrosis.  Clinicians would have to utilize  clinical acumen, EKG, Troponin and serial changes to determine if it is an Acute Myocardial Infarction or myocardial injury due to an underlying chronic condition.           Xr Chest 1 View    Result Date: 10/5/2019  Narrative: EXAMINATION: XR CHEST 1 VW-  10/5/2019 1:31 PM CDT  HISTORY: Chest pain.  1 view chest x-ray 07/29/2019.  Magnified heart size. Probable borderline enlargement. Normal cardiac pacer.  Fully expanded lungs with mild chronic interstitial disease.  No infiltrate or effusion.  Summary: 1. No acute disease.  This report was finalized on 10/05/2019 13:39 by Dr. Tyrese Kim MD.    Ct Angiogram Chest With Contrast    Result Date: 10/5/2019  Narrative: EXAMINATION: CT ANGIOGRAM CHEST W CONTRAST-   10/5/2019 1:49 PM CDT  HISTORY: Status post heart catheterization. Chest pain and shortness of breath.  In order to have a CT radiation dose as low as reasonably achievable Automated Exposure Control was utilized for adjustment of the mA and/or KV according to patient size.  DLP in mGycm= 189.  CT angiography protocol. CT imaging with bolus IV contrast injection. Under concurrent supervision axial, sagittal, coronal, and three-dimensional data sets were constructed.  Borderline heart size. Coronary artery calcification.  Symmetric well opacified pulmonary arteries. No pulmonary embolism.  Chronic lung changes with mild hyperexpansion. Mild left lower lobe atelectasis.  No pneumonia, pneumothorax, or pleural effusion.  Summary: 1. Chronic lung changes. 2. No pulmonary embolism.            This report was finalized on 10/05/2019 14:36 by Dr. Tyrese Kim MD.      ED Course  ED Course as of Oct 05 1626   Sat Oct 05, 2019   1306 Mr. Rocco Wolff, interrogator rep, informs me that she is in atrial flutter found on her interrogation.    [TK]   1413 Patient's previous records reveal that the patient does have a history of type II heart block with bradycardia.  Therefore, the pacemaker was placed 7/2019.  I do  not see previous documentation of atrial flutter.  [TK]   1546 I have spoken with Mr. Wolff again, the pacemaker representative.  He noted that the patient displayed evidence of atrial flutter at 1300 p.m. yesterday and has been in it up till now.  [TK]   1622 I have spoken with Dr. Jordan, the patient's cardiologist.  He would like the patient discharged with prescriptions with Multaq and Xarelto as well as patient to follow-up with their office on Monday for further follow-up care.  This has been relayed to the patient and family members.  They are agreeable to the plan.  Strict return precautions advised.  Patient will be discharged.  [TK]      ED Course User Index  [TK] Shyane Garcia PA         HEART Score (for prediction of 6-week risk of major adverse cardiac event) reviewed and/or performed as part of the patient evaluation and treatment planning process.  The result associated with this review/performance is: 6      MDM  Number of Diagnoses or Management Options  Typical atrial flutter (CMS/HCC):   Diagnosis management comments: Differential Diagnosis:  I considered chest wall pain, muscle strain, costochondritis, pleurisy, rib fracture, herpes zoster, cardiovascular etiology, myocardial infarction, intermediate coronary syndrome, unstable angina, angina, aortic dissection, pericarditis, pulmonary etiology, pulmonary embolism, pneumonia, pneumothorax, lung cancer, gastroesophageal reflux disease, esophagitis, esophageal spasm and gastrointestinal etiology as a possible cause of chest pain in this patient. This is a partial list of diagnoses considered.        Final diagnoses:   Atrial flutter, unspecified type (CMS/HCC)          Shayne Garcia PA  10/05/19 8937

## 2019-10-05 NOTE — DISCHARGE INSTRUCTIONS
Atrial Flutter    Atrial flutter is a type of abnormal heart rhythm (arrhythmia). The heart has an electrical system that tells the heart how to beat. In atrial flutter, the signals move rapidly in the top chambers of the heart (the atria). This makes your heart beat very fast. Atrial flutter can come and go, or it can be permanent.  If this condition is not treated it can cause serious complications, such as stroke or weakened heart muscle (cardiomyopathy).  What are the causes?  This condition may be caused by:  · A heart condition or problem, such as:  ? A heart attack.  ? Heart failure.  ? A heart valve problem.  ? Heart surgery.  · A lung problem, such as:  ? A blood clot in the lungs (pulmonary embolism, or PE).  ? Chronic obstructive pulmonary disease.  · Poorly controlled high blood pressure (hypertension).  · Overactive thyroid (hyperthyroidism).  · Caffeine.  · Some decongestant cold medicines.  · Low levels of minerals called electrolytes in the blood.  · Cocaine.  What increases the risk?  You are more likely to develop this condition if:  · You are an elderly adult.  · You are a man.  · You are obese.  · You have obstructive sleep apnea.  · You have a family history of atrial flutter.  · You have diabetes.  What are the signs or symptoms?  Symptoms of this condition include:  · A feeling that your heart is pounding or racing (palpitations).  · Shortness of breath.  · Chest pain.  · Feeling light-headed.  · Dizziness.  · Fainting.  · Low blood pressure (hypotension).  · Fatigue.  Sometimes there are no symptoms associated with arrhythmia.  How is this diagnosed?  This condition may be diagnosed based on:  · An electrocardiogram (ECG). This is a test that records the electrical signals in the heart.  · Ambulatory cardiac monitoring. This is a small recording device that is connected by wires to flat, sticky disks (electrodes) that are attached to your chest.  · An echocardiogram. This is a test that uses  sound waves to create pictures of your heart.  · A transesophageal echocardiogram (NASREEN). In this test, a device is placed down your esophagus. This device then uses sounds waves to create even closer pictures of your heart.  · Stress test. This test records your heartbeat while you exercise and checks to see if the heart muscle is receiving adequate blood supply.  How is this treated?  This condition may be treated with:  · Medicines to:  ? Make your heart beat more slowly.  ? Keep your heart in normal rhythm.  ? Prevent a stroke.  · Cardioversion. This uses medicines or an electrical shock to make the heart beat normally.  · Ablation. This destroys the heart tissue that is causing the problem.  In some cases, your health care provider will treat other underlying conditions.  Follow these instructions at home:  Medicines  · Take over-the-counter and prescription medicines only as told by your health care provider.  ? Make sure you take your medicines exactly as told by your health care provider.  ? Do not miss any doses.  · Do not take any new medicines without talking to your health care provider.  Lifestyle  · Eat heart-healthy foods. Talk with a dietitian to make an eating plan that is right for you.  · Do not use any products that contain nicotine or tobacco, such as cigarettes and e-cigarettes. If you need help quitting, ask your health care provider.  · Limit alcohol intake to no more than 1 drink per day for nonpregnant women and 2 drinks per day for men. One drink equals 12 oz of beer, 5 oz of wine, or 1½ oz of hard liquor.  · Try to reduce any stress. Stress can make your symptoms worse.  · Get screened for sleep apnea. If you have the condition, work with your health care provider to find a treatment that works for you.  · Do not use drugs.  · Avoid excessive caffeine.  General instructions  · Lose weight if your health care provider tells you to do that.  · Keep all follow-up visits as told by your health  "care provider. This is important.  Contact a health care provider if:  · Your symptoms get worse.  · You notice that your palpitations are increasing.  Get help right away if:  · You have any symptoms of a stroke. \"BE FAST\" is an easy way to remember the main warning signs of a stroke:  ? B - Balance. Signs are dizziness, sudden trouble walking, or loss of balance.  ? E - Eyes. Signs are trouble seeing or a sudden change in vision.  ? F - Face. Signs are sudden weakness or numbness of the face, or the face or eyelid drooping on one side.  ? A - Arms. Signs are weakness or numbness in an arm. This happens suddenly and usually on one side of the body.  ? S - Speech. Signs are sudden trouble speaking, slurred speech, or trouble understanding what people say.  ? T - Time. Time to call emergency services. Write down what time symptoms started.  · You have other signs of a stroke, such as:  ? A sudden, severe headache with no known cause.  ? Nausea or vomiting.  ? Seizure.  · You have additional symptoms, such as:  ? Fainting.  ? Shortness of breath.  ? Pain or pressure in your chest.  ? Suddenly feeling nauseous or suddenly vomiting.  ? Increased sweating with no known cause.  · These symptoms may represent a serious problem that is an emergency. Do not wait to see if the symptoms will go away. Get medical help right away. Call your local emergency services (911 in the [place] Do not drive yourself to the hospital.  Summary  · Atrial flutter is an abnormal heart rhythm that can give you symptoms of palpitations, shortness of breath, or fatigue.  · Atrial flutter is often treated with medicines to keep your heart in a normal rhythm and to prevent a stroke.  · You should seek immediate help if you cannot catch your breath, have chest pain or pressure, or have weakness, especially on one side of your body.  This information is not intended to replace advice given to you by your health care provider. Make sure you discuss any " questions you have with your health care provider.  Document Released: 05/06/2010 Document Revised: 09/20/2018 Document Reviewed: 09/20/2018  [employer] Interactive Patient Education © 2019 [employer]

## 2019-10-07 ENCOUNTER — DOCUMENTATION (OUTPATIENT)
Dept: CARDIOLOGY | Facility: CLINIC | Age: 74
End: 2019-10-07

## 2019-10-07 DIAGNOSIS — R00.1 SYMPTOMATIC BRADYCARDIA: ICD-10-CM

## 2019-10-07 DIAGNOSIS — Z95.0 PRESENCE OF CARDIAC PACEMAKER: Primary | ICD-10-CM

## 2019-10-08 ENCOUNTER — TELEPHONE (OUTPATIENT)
Dept: CARDIOLOGY | Facility: CLINIC | Age: 74
End: 2019-10-08

## 2019-10-08 NOTE — TELEPHONE ENCOUNTER
Patients relative has called in stating patient was in ER over the weekend and was instructed to call this week for F/U. Patient has upcoming appointment on 10/30, does patient need to be seen sooner than that? Please advise

## 2019-10-10 PROBLEM — Z95.0 PRESENCE OF CARDIAC PACEMAKER: Status: ACTIVE | Noted: 2019-10-10

## 2019-10-10 NOTE — PROGRESS NOTES
Dual Chamber Pacemaker Evaluation Report  Remote/Merlin on Demand    Interrogation Date:  10/5/2019    Primary Cardiologist: Nikki  : [employer] Model: Raquel MRI 2272 Pacemaker  Implant date: 7/29/2019    Reason for evaluation: remote transmission sent from [facility] ED  Indication for pacemaker: complete heart block    Measurements  Atrial sensing - P wave: 3.6 mV  Atrial threshold: 1.625V@ 0.4ms  Atrial lead impedance: 830 ohms  Ventricular sensing - R wave: 9.1 mV  Ventricular threshold: N/P  Ventricular lead impedance:   480 ohms     Diagnostic Data  Atrial paced: 21 %  Ventricular paced: 3.8 %    Episodes recorded since 9/10/2019:  Numerous atrial flutter episodes on October 4-5:  Longest duration 2.5 hours.  Ventricular rates controlled.  [employer] 2.6%.  Patient seen at [facility] ED and started on Multaq and Xarelto.      Battery status: satisfactory   3.02V, estimated 5.8-11 years remaining    Final Parameters  Mode:  DDD  Lower rate: 60 bpm   Upper rate: 130 bpm  AV Delay: paced- 200 ms  Sensed-150 ms  Atrial - Amplitude: 3.125 V   Pulse width: 0.4 ms   Sensitivity: Auto mV     Ventricular - Amplitude: 5 V  Pulse width: 0.4 ms  Sensitivity: Auto mV    Changes made: No changes made.  Conclusions: normal pacemaker function, adequate battery reserve and atrial flutter is new since last evaluation    Follow up: Every 3 months via Merlin, annually in office (9/15/2020)

## 2019-10-11 ENCOUNTER — OFFICE VISIT (OUTPATIENT)
Dept: CARDIOLOGY | Facility: CLINIC | Age: 74
End: 2019-10-11

## 2019-10-11 VITALS
WEIGHT: 104 LBS | DIASTOLIC BLOOD PRESSURE: 70 MMHG | BODY MASS INDEX: 19.14 KG/M2 | HEART RATE: 62 BPM | HEIGHT: 62 IN | SYSTOLIC BLOOD PRESSURE: 125 MMHG | RESPIRATION RATE: 18 BRPM | OXYGEN SATURATION: 98 %

## 2019-10-11 DIAGNOSIS — I45.9 HEART BLOCK: Primary | ICD-10-CM

## 2019-10-11 DIAGNOSIS — I48.92 ATRIAL FLUTTER WITH CONTROLLED RESPONSE (HCC): ICD-10-CM

## 2019-10-11 DIAGNOSIS — G20 PARKINSON'S DISEASE (HCC): Chronic | ICD-10-CM

## 2019-10-11 PROCEDURE — 99214 OFFICE O/P EST MOD 30 MIN: CPT | Performed by: NURSE PRACTITIONER

## 2019-10-11 PROCEDURE — 93000 ELECTROCARDIOGRAM COMPLETE: CPT | Performed by: NURSE PRACTITIONER

## 2019-10-11 NOTE — PROGRESS NOTES
"    Subjective:     Encounter Date:10/11/2019      Patient ID: Kristen Mittal is a 74 y.o. female.    Chief Complaint: follow up atrial flutter, chb s/p pacemaker   The patient presents today for follow up regarding her recently diagnosed atrial flutter after presenting to [facility] ER with chest pain 1 week ago. The ER provider consulted with Dr. Jordan and the patient was started on Multaq and Xarelto and discharged home in stable condition. She is also followed by Dr. Jordan for her history of complete heart block s/p dual chamber pacemaker. Also, of note the patient underwent cardiac cath 1 month ago due to chest pain with mildly elevated troponin and was found to have non obstructive CAD with a normal LVEF and normal LVEDP. However, it was discovered that her chest pain correlated with runs of wide complex tachycardia felt possibly to be pacemaker mediated tachycardia. The patient's speech is difficult to understand due to chronic vocal cord problems. However, she reports she continues to have some intermittent epigastric pain with exertion. She denies any further palpitations since starting the multaq. She denies any signs of bleeding since starting the Xarelto, or any history of major bleeding. She denies shortness of breath, orthopnea, PND, syncope or pre syncope. She is compliant with her medications and reports good blood pressure control.         The following portions of the patient's history were reviewed and updated as appropriate: allergies, current medications, past family history, past medical history, past social history, past surgical history and problem list.  /70 (BP Location: Right arm, Patient Position: Sitting, Cuff Size: Adult)   Pulse 62   Resp 18   Ht 157.5 cm (62\")   Wt 47.2 kg (104 lb)   SpO2 98%   Breastfeeding? No   BMI 19.02 kg/m²   No Known Allergies    Current Outpatient Medications:   •  aspirin 81 MG chewable tablet, Chew 81 mg Daily., Disp: , Rfl:   •  " "carbidopa-levodopa (SINEMET)  MG per tablet, Take 1.5 tablets by mouth 2 (Two) Times a Day With Meals., Disp: , Rfl:   •  cholecalciferol (VITAMIN D3) 1000 units tablet, Take 1,000 Units by mouth 2 (Two) Times a Day., Disp: , Rfl:   •  dronedarone (MULTAQ) 400 MG tablet, Take 1 tablet by mouth 2 (Two) Times a Day With Meals., Disp: 60 tablet, Rfl: 0  •  folic acid (FOLVITE) 400 MCG tablet, Take 400 mcg by mouth Daily., Disp: , Rfl:   •  hydroxychloroquine (PLAQUENIL) 200 MG tablet, Take 200 mg by mouth Daily With Breakfast., Disp: , Rfl:   •  hydroxychloroquine (PLAQUENIL) 200 MG tablet, Take 100 mg by mouth Every Night., Disp: , Rfl:   •  omeprazole (priLOSEC) 20 MG capsule, Take 20 mg by mouth Daily., Disp: , Rfl:   •  predniSONE (DELTASONE) 1 MG tablet, Take 2 mg by mouth 2 (Two) Times a Day., Disp: , Rfl:   •  raloxifene (EVISTA) 60 MG tablet, Take 60 mg by mouth Daily., Disp: , Rfl:   •  rivaroxaban (XARELTO) 20 MG tablet, Take 1 tablet by mouth Daily With Dinner., Disp: 30 tablet, Rfl: 0  Past Medical History:   Diagnosis Date   • CHF (congestive heart failure) (CMS/HCC)    • COPD (chronic obstructive pulmonary disease) (CMS/HCC)    • GERD (gastroesophageal reflux disease)    • Osteoporosis    • Parkinson's disease (CMS/HCC)    • Rheumatoid arthritis (CMS/HCC)    • Speech abnormality     Pt states \"they dont know why my speech is like this but it has been that way for a long time.\"   • Stroke (CMS/HCC)     Pt states she has not had a stroke and doesnt know why its listed.     Past Surgical History:   Procedure Laterality Date   • CARDIAC CATHETERIZATION Left 9/2/2019    Procedure: Cardiac Catheterization/Vascular Study;  Surgeon: Ryland Lopes MD;  Location:  [facility]ATH [facility]AS[facility];  Service: Cardiology   • CARDIAC ELECTROPHYSIOLOGY PROCEDURE N/A 7/29/2019    Procedure: Pacemaker SC new;  Surgeon: Christiano Jordan MD;  Location:  [facility] CATH [facility];  Service: Cardiology "     Social History     Socioeconomic History   • Marital status:      Spouse name: Not on file   • Number of children: Not on file   • Years of education: Not on file   • Highest education level: Not on file   Tobacco Use   • Smoking status: Never Smoker   • Smokeless tobacco: Never Used   Substance and Sexual Activity   • Alcohol use: No   • Drug use: No   • Sexual activity: Defer     History reviewed. No pertinent family history.    Review of Systems   Constitution: Negative for chills, diaphoresis, fever and weakness.   HENT: Negative for nosebleeds.    Eyes: Negative for visual disturbance.   Cardiovascular: Positive for chest pain (epigastric pain ). Negative for claudication, cyanosis, dyspnea on exertion, irregular heartbeat, leg swelling, near-syncope, orthopnea, palpitations, paroxysmal nocturnal dyspnea and syncope.   Respiratory: Negative for cough, hemoptysis, shortness of breath, sputum production and wheezing.    Hematologic/Lymphatic: Negative for bleeding problem.   Skin: Negative for color change and flushing.   Musculoskeletal: Negative for falls.   Gastrointestinal: Negative for bloating, abdominal pain, hematemesis, hematochezia, melena, nausea and vomiting.   Genitourinary: Negative for hematuria.   Neurological: Positive for tremors. Negative for dizziness and light-headedness.        Speech difficulty    Psychiatric/Behavioral: Negative for altered mental status.         ECG 12 Lead  Date/Time: 10/11/2019 4:56 PM  Performed by: Jen Barnes APRN  Authorized by: Jen Barnes APRN   Comparison: compared with previous ECG from 10/5/2019  Similar to previous ECG  Comparison to previous ECG: Atrial paced   Rhythm: sinus rhythm and paced  Conduction: right bundle branch block               Objective:     Physical Exam   Constitutional: She is oriented to person, place, and time. She appears well-developed and well-nourished. No distress.   HENT:   Head: Normocephalic and atraumatic.   Eyes:  Pupils are equal, round, and reactive to light.   Neck: Normal range of motion. Neck supple. No JVD present. No thyromegaly present.   Cardiovascular: Normal rate, regular rhythm and intact distal pulses. Exam reveals no gallop and no friction rub.   Murmur heard.  Pulmonary/Chest: Effort normal and breath sounds normal. No respiratory distress. She has no wheezes. She has no rales. She exhibits no tenderness.   Abdominal: Soft. Bowel sounds are normal. She exhibits no distension. There is no tenderness.   Musculoskeletal: Normal range of motion. She exhibits no edema.   Neurological: She is alert and oriented to person, place, and time. No cranial nerve deficit.   Skin: Skin is warm and dry. She is not diaphoretic.   Psychiatric: She has a normal mood and affect. Her behavior is normal.     9/2019 echo reviewed :    · Left ventricular systolic function is normal. LVEF 61-65%.  · Moderate aortic valve regurgitation is present.  · Left ventricular wall thickness is consistent with mild concentric hypertrophy.  · Left ventricular diastolic dysfunction (grade I) consistent with impaired relaxation.  · Left atrial cavity size is moderately dilated.  · Mild-to-moderate pulmonic valve regurgitation is present.  · Normal size and function of the right ventricle.      Assessment:          Diagnosis Plan   1. Heart block    Complete heart block s/p dc pacemaker   Stable   Normal function and battery life on interrogation last week     2. Atrial flutter with controlled response (CMS/HCC)    Newly diagnosed and started on Xarelto and Multaq 1 week ago  Maintaining NSR and tolerating the medications well   No further palpitations     3. Parkinson's disease (CMS/HCC)  Pt is unclear if she has Parkinson's disease and or if she has had a stroke in the past but states she follows with neurology, Dr. Garcia in [place] - obtain records  Defer continuation of aspirin for stroke history to neurology. From a cardiac standpoint her aspirin  could be discontinued      4. Moderate AI - continue to monitor with regular echos and based on symptoms     5. Chest pain - she describes this as more of an epigastric pain. Based on her recent cath , this is not pain secondary to coronary ischemia. I favor non cardiac chest pain, although it could possibly be related to her arrhythmia ; she will follow up with her pcp for further non cardiac chest pain workup      Plan:       As noted above  Continue current medical therapy   Follow up 3 months with repeat pacemaker interrogation as well, sooner with symptoms or concerns

## 2019-11-13 RX ORDER — ATORVASTATIN CALCIUM 80 MG/1
TABLET, FILM COATED ORAL
Qty: 30 TABLET | Refills: 11 | OUTPATIENT
Start: 2019-11-13

## 2019-11-14 RX ORDER — ATORVASTATIN CALCIUM 80 MG/1
TABLET, FILM COATED ORAL
Qty: 30 TABLET | Refills: 11 | OUTPATIENT
Start: 2019-11-14

## 2019-12-11 ENCOUNTER — CLINICAL SUPPORT (OUTPATIENT)
Dept: CARDIOLOGY | Facility: CLINIC | Age: 74
End: 2019-12-11

## 2019-12-11 DIAGNOSIS — I45.9 HEART BLOCK: ICD-10-CM

## 2019-12-11 PROCEDURE — 93296 REM INTERROG EVL PM/IDS: CPT | Performed by: PHYSICIAN ASSISTANT

## 2019-12-11 PROCEDURE — 93294 REM INTERROG EVL PM/LDLS PM: CPT | Performed by: PHYSICIAN ASSISTANT

## 2020-01-31 ENCOUNTER — OFFICE VISIT (OUTPATIENT)
Dept: CARDIOLOGY | Facility: CLINIC | Age: 75
End: 2020-01-31

## 2020-01-31 VITALS
DIASTOLIC BLOOD PRESSURE: 72 MMHG | HEIGHT: 62 IN | BODY MASS INDEX: 19.69 KG/M2 | OXYGEN SATURATION: 95 % | WEIGHT: 107 LBS | HEART RATE: 77 BPM | SYSTOLIC BLOOD PRESSURE: 152 MMHG

## 2020-01-31 DIAGNOSIS — I48.92 ATRIAL FLUTTER WITH CONTROLLED RESPONSE (HCC): Primary | ICD-10-CM

## 2020-01-31 PROCEDURE — 93000 ELECTROCARDIOGRAM COMPLETE: CPT | Performed by: NURSE PRACTITIONER

## 2020-01-31 PROCEDURE — 99213 OFFICE O/P EST LOW 20 MIN: CPT | Performed by: NURSE PRACTITIONER

## 2020-01-31 NOTE — PROGRESS NOTES
"    Subjective:     Encounter Date:01/31/2020      Patient ID: Kristen Mittal is a 74 y.o. female.    Chief Complaint: follow up atrial flutter, chb s/p pacemaker   The patient presents today for follow up regarding her atrial flutter. The ER provider consulted with Dr. Jordan 10/2019 and the patient was started on Multaq and Xarelto for atrial flutter and she was discharged home in stable condition. She is also followed by Dr. Jordan for her history of complete heart block s/p dual chamber pacemaker. Also, of note the patient underwent cardiac cath 9/2019 due to chest pain with mildly elevated troponin and was found to have non obstructive CAD with a normal LVEF and normal LVEDP. However, it was discovered that her chest pain correlated with runs of wide complex tachycardia felt possibly to be pacemaker mediated tachycardia. The patient's speech is difficult to understand due to chronic vocal cord problems. However, she reports she is feeling well overall. She denies shortness of breath, chest pain, edema, orthopnea, PND, palpitations, syncope or pre syncope. She is compliant with her medications and reports good blood pressure control.       The following portions of the patient's history were reviewed and updated as appropriate: allergies, current medications, past family history, past medical history, past social history, past surgical history and problem list.  /72   Pulse 77   Ht 157.5 cm (62\")   Wt 48.5 kg (107 lb)   SpO2 95%   BMI 19.57 kg/m²   No Known Allergies    Current Outpatient Medications:   •  carbidopa-levodopa (SINEMET)  MG per tablet, Take 1.5 tablets by mouth 2 (Two) Times a Day With Meals., Disp: , Rfl:   •  cholecalciferol (VITAMIN D3) 1000 units tablet, Take 1,000 Units by mouth 2 (Two) Times a Day., Disp: , Rfl:   •  dronedarone (MULTAQ) 400 MG tablet, Take 1 tablet by mouth 2 (Two) Times a Day With Meals., Disp: 60 tablet, Rfl: 11  •  folic acid (FOLVITE) 400 MCG tablet, " "Take 400 mcg by mouth Daily., Disp: , Rfl:   •  hydroxychloroquine (PLAQUENIL) 200 MG tablet, Take 200 mg by mouth Daily With Breakfast., Disp: , Rfl:   •  hydroxychloroquine (PLAQUENIL) 200 MG tablet, Take 100 mg by mouth Every Night., Disp: , Rfl:   •  methotrexate 2.5 MG tablet, Take 10 mg by mouth 1 (One) Time Per Week., Disp: , Rfl:   •  omeprazole (priLOSEC) 20 MG capsule, Take 20 mg by mouth Daily., Disp: , Rfl:   •  predniSONE (DELTASONE) 1 MG tablet, Take 2 mg by mouth 2 (Two) Times a Day., Disp: , Rfl:   •  raloxifene (EVISTA) 60 MG tablet, Take 60 mg by mouth Daily., Disp: , Rfl:   •  rivaroxaban (XARELTO) 20 MG tablet, Take 1 tablet by mouth Daily With Dinner., Disp: 30 tablet, Rfl: 11  Past Medical History:   Diagnosis Date   • CHF (congestive heart failure) (CMS/HCC)    • COPD (chronic obstructive pulmonary disease) (CMS/HCC)    • GERD (gastroesophageal reflux disease)    • Osteoporosis    • Parkinson's disease (CMS/HCC)    • Rheumatoid arthritis (CMS/HCC)    • Speech abnormality     Pt states \"they dont know why my speech is like this but it has been that way for a long time.\"   • Stroke (CMS/HCC)     Pt states she has not had a stroke and doesnt know why its listed.     Past Surgical History:   Procedure Laterality Date   • CARDIAC CATHETERIZATION Left 9/2/2019    Procedure: Cardiac Catheterization/Vascular Study;  Surgeon: Ryland Lopes MD;  Location:  [facility];  Service: Cardiology   • CARDIAC ELECTROPHYSIOLOGY PROCEDURE N/A 7/29/2019    Procedure: Pacemaker SC new;  Surgeon: Christiano Jordan MD;  Location:  [facility];  Service: Cardiology     Social History     Socioeconomic History   • Marital status:      Spouse name: Not on file   • Number of children: Not on file   • Years of education: Not on file   • Highest education level: Not on file   Tobacco Use   • Smoking status: Former Smoker   • Smokeless tobacco: Never Used   Substance and " Sexual Activity   • Alcohol use: No   • Drug use: No   • Sexual activity: Defer     Family History   Problem Relation Age of Onset   • Heart attack Mother        Review of Systems   Constitution: Negative for chills, diaphoresis and fever.   HENT: Negative for nosebleeds.    Eyes: Negative for visual disturbance.   Cardiovascular: Negative for chest pain, claudication, cyanosis, dyspnea on exertion, irregular heartbeat, leg swelling, near-syncope, orthopnea, palpitations, paroxysmal nocturnal dyspnea and syncope.   Respiratory: Negative for cough, hemoptysis, shortness of breath, sputum production and wheezing.    Hematologic/Lymphatic: Negative for bleeding problem.   Skin: Negative for color change and flushing.   Musculoskeletal: Negative for falls.   Gastrointestinal: Negative for bloating, abdominal pain, hematemesis, hematochezia, melena, nausea and vomiting.   Genitourinary: Negative for hematuria.   Neurological: Positive for tremors. Negative for dizziness, light-headedness and weakness.        Speech difficulty    Psychiatric/Behavioral: Negative for altered mental status.         ECG 12 Lead  Date/Time: 1/31/2020 1:02 PM  Performed by: Jen Barnes APRN  Authorized by: Jen Barnes APRN   Comparison: compared with previous ECG from 10/11/2019  Similar to previous ECG  Comparison to previous ECG: Atrial paced   Rhythm: sinus rhythm and paced  BPM: 77  Conduction: right bundle branch block               Objective:     Physical Exam   Constitutional: She is oriented to person, place, and time. She appears well-developed and well-nourished. No distress.   HENT:   Head: Normocephalic and atraumatic.   Eyes: Pupils are equal, round, and reactive to light.   Neck: Normal range of motion. Neck supple. No JVD present. No thyromegaly present.   Cardiovascular: Normal rate, regular rhythm and intact distal pulses. Exam reveals no gallop and no friction rub.   Murmur heard.  Pulmonary/Chest: Effort normal and  breath sounds normal. No respiratory distress. She has no wheezes. She has no rales. She exhibits no tenderness.   Abdominal: Soft. Bowel sounds are normal. She exhibits no distension. There is no tenderness.   Musculoskeletal: Normal range of motion. She exhibits no edema.   Neurological: She is alert and oriented to person, place, and time. No cranial nerve deficit.   Skin: Skin is warm and dry. She is not diaphoretic.   Psychiatric: She has a normal mood and affect. Her behavior is normal.     9/2019 echo reviewed :    · Left ventricular systolic function is normal. LVEF 61-65%.  · Moderate aortic valve regurgitation is present.  · Left ventricular wall thickness is consistent with mild concentric hypertrophy.  · Left ventricular diastolic dysfunction (grade I) consistent with impaired relaxation.  · Left atrial cavity size is moderately dilated.  · Mild-to-moderate pulmonic valve regurgitation is present.  · Normal size and function of the right ventricle.      Assessment:          Diagnosis Plan   1. Heart block    Complete heart block s/p dc pacemaker   Stable   Normal function and battery life on interrogation 12/2019     2. Atrial flutter with controlled response (CMS/HCC)    Continue anticoagulation with Xarelto  Continue Multaq - NSR on EKG today   No palpitations   Some atrial flutter episodes noted on 12/2019 interrogation - longest episode 1.5 hours      3. Parkinson's disease (CMS/HCC)  Pt is unclear if she has Parkinson's disease and or if she has had a stroke in the past but states she follows with neurology, Dr. Garcia in [place] - obtain records       4. Moderate AI - continue to monitor with regular echos and based on symptoms        Plan:       As noted above  Continue Xarelto and Multaq   Follow up 6 months, sooner with symptoms or concerns

## 2020-02-03 ENCOUNTER — TELEPHONE (OUTPATIENT)
Dept: CARDIOLOGY | Facility: CLINIC | Age: 75
End: 2020-02-03

## 2020-02-03 NOTE — TELEPHONE ENCOUNTER
Spoke to patient's sister, Kristen Mittal, while patient was with her. Informed them I'll be mailing them an application for patient assistance for both Xarelto and Multaq. They will return them as soon as they are filled out.Joseline Smith MA

## 2020-02-03 NOTE — TELEPHONE ENCOUNTER
----- Message from RITO Sultana sent at 1/31/2020 12:55 PM CST -----  This pt needs help with cost on Xarelto and Multaq if possible. May try PAs or patient assistance or whatever she may qualify for. Please contact the pt for further information. Her speech is very difficult to understand so you may need to speak with a family member. Thanks

## 2020-02-20 ENCOUNTER — DOCUMENTATION (OUTPATIENT)
Dept: CARDIOLOGY | Facility: CLINIC | Age: 75
End: 2020-02-20

## 2020-02-20 NOTE — PROGRESS NOTES
Patient assistance application was submitted for Multaq 400mg BID on 2/14/2020. Received denial letter 2/19/2020 due to patient having Medicare Part D Prescription coverage and has not reached the 2% of income out of pocket required spending. Notified patient and mailed a copy of the denial and requirements.Joseline Smith MA

## 2020-03-11 ENCOUNTER — CLINICAL SUPPORT (OUTPATIENT)
Dept: CARDIOLOGY | Facility: CLINIC | Age: 75
End: 2020-03-11

## 2020-03-11 DIAGNOSIS — I45.9 HEART BLOCK: ICD-10-CM

## 2020-03-11 PROCEDURE — 93296 REM INTERROG EVL PM/IDS: CPT | Performed by: PHYSICIAN ASSISTANT

## 2020-03-11 PROCEDURE — 93294 REM INTERROG EVL PM/LDLS PM: CPT | Performed by: PHYSICIAN ASSISTANT

## 2020-03-12 NOTE — PROGRESS NOTES
Dual Chamber Pacemaker Evaluation Report  [facility]    March 11, 2020    Primary Cardiologist: Nikki  : [employer] Model: Assurity MRI  Implant date: 7/29/19    Reason for evaluation: routine  Indication for pacemaker: complete heart block    Measurements  Atrial sensing - P wave: 2.1 mV  Atrial threshold: 2.875 V@ 0.4 ms  Atrial lead impedance: 860 ohms  Ventricular sensing - R wave: 7.9 mV  Ventricular threshold: 0.875 V @ 0.4 ms  Ventricular lead impedance:   480 ohms     Diagnostic Data  Atrial paced: 67 %  Ventricular paced: 14 %  Other: AMS entries noted.  Longest is 58 sec.  Pt is anticoagulated.  Battery status: satisfactory         Final Parameters  Mode:  DDD  Lower rate: 60 bpm   Upper rate: 130 bpm  AV Delay: paced- 200 ms  Sensed-150 ms  Atrial - Amplitude: 4.875 V   Pulse width: 0.4 ms   Sensitivity: auto     Ventricular - Amplitude: 1.125 V  Pulse width: 0.4 ms  Sensitivity: auto    Changes made: n/a  Conclusions: normal pacemaker function    Follow up: 3 months

## 2020-06-09 ENCOUNTER — DOCUMENTATION (OUTPATIENT)
Dept: CARDIOLOGY | Facility: CLINIC | Age: 75
End: 2020-06-09

## 2020-06-09 DIAGNOSIS — Z95.0 PRESENCE OF CARDIAC PACEMAKER: Primary | ICD-10-CM

## 2020-06-09 DIAGNOSIS — I45.9 HEART BLOCK: ICD-10-CM

## 2020-06-12 NOTE — PROGRESS NOTES
Dual Chamber Pacemaker Evaluation Report  [facility]    June 9, 2020    Primary Cardiologist: Nikki  : [employer] Model: Assurity MRI 2272 Pacemaker  Implant date: 7/29/2019    Reason for evaluation: routine  Indication for pacemaker: complete heart block    Measurements  Atrial sensing - P wave: 4.2 mV  Atrial threshold: 2.75V@ 0.4ms  Atrial lead impedance: 940 ohms  Ventricular sensing - R wave: >12 mV  Ventricular threshold: 1 V @ 0.4 ms  Ventricular lead impedance:   480 ohms     Diagnostic Data  Atrial paced: 62 %  Ventricular paced: 13 %    Episodes recorded since 3/11/2020:  P-AFl, burden 3.1%:  Longest duration 9 hours, ventricular rates controlled.  Anticoagulated with Xarelto.    Battery status: satisfactory   2.99V, estimated 8-8.6 years remaining    Final Parameters  Mode:  DDD  Lower rate: 60 bpm   Upper rate: 130 bpm  AV Delay: paced- 200 ms  Sensed-150 ms  Atrial - Amplitude: 4.75 V   Pulse width: 0.4 ms   Sensitivity: Auto mV     Ventricular - Amplitude: 1.25 V  Pulse width: 0.4 ms  Sensitivity: Auto mV    Changes made: No changes made.  Conclusions: normal pacemaker function, stable pacing and sensing thresholds and adequate battery reserve    Follow up: Every 3 months via [facility], annually in office (9/15/2020)

## 2020-07-31 ENCOUNTER — OFFICE VISIT (OUTPATIENT)
Dept: CARDIOLOGY | Facility: CLINIC | Age: 75
End: 2020-07-31

## 2020-07-31 VITALS
WEIGHT: 107 LBS | OXYGEN SATURATION: 98 % | SYSTOLIC BLOOD PRESSURE: 134 MMHG | HEIGHT: 62 IN | HEART RATE: 68 BPM | DIASTOLIC BLOOD PRESSURE: 72 MMHG | BODY MASS INDEX: 19.69 KG/M2

## 2020-07-31 DIAGNOSIS — I25.10 NONOBSTRUCTIVE ATHEROSCLEROSIS OF CORONARY ARTERY: ICD-10-CM

## 2020-07-31 DIAGNOSIS — I45.9 HEART BLOCK: ICD-10-CM

## 2020-07-31 DIAGNOSIS — I48.92 ATRIAL FLUTTER WITH CONTROLLED RESPONSE (HCC): ICD-10-CM

## 2020-07-31 DIAGNOSIS — R07.9 CHEST PAIN, UNSPECIFIED TYPE: Primary | ICD-10-CM

## 2020-07-31 DIAGNOSIS — Z95.0 PRESENCE OF CARDIAC PACEMAKER: ICD-10-CM

## 2020-07-31 PROCEDURE — 93000 ELECTROCARDIOGRAM COMPLETE: CPT | Performed by: NURSE PRACTITIONER

## 2020-07-31 PROCEDURE — 99214 OFFICE O/P EST MOD 30 MIN: CPT | Performed by: NURSE PRACTITIONER

## 2020-07-31 RX ORDER — ASPIRIN 81 MG/1
81 TABLET ORAL DAILY
COMMUNITY
End: 2021-03-16 | Stop reason: ALTCHOICE

## 2020-07-31 NOTE — PROGRESS NOTES
"Subjective:     Encounter Date:2020      Patient ID: Kristen Mittal is a 75 y.o. female     Chief Complaint:  Chest Pain    This is a new problem. The current episode started 1 to 4 weeks ago. The onset quality is sudden. The problem occurs intermittently. The problem has been unchanged. The pain is present in the epigastric region. The pain is at a severity of 4/10. The pain is moderate. The quality of the pain is described as sharp. The pain does not radiate. Pertinent negatives include no abdominal pain, back pain, cough, dizziness, fever, headaches, hemoptysis, malaise/fatigue, nausea, near-syncope, orthopnea, palpitations, PND, shortness of breath, syncope or vomiting. Associated symptoms comments: Anxiety, emotional upset. The pain is aggravated by emotional upset.   Atrial Flutter   This is a chronic problem. The current episode started more than 1 year ago. The problem occurs intermittently. The problem has been unchanged. Associated symptoms include chest pain. Pertinent negatives include no abdominal pain, chills, coughing, fever, headaches, nausea, rash or vomiting.     Patient presents today for routine follow up. Patient is followed for a history of nonobstructive coronary artery disease, pacemaker, complete heart block and atrial flutter. Patient had a heart cath in 2019 due to chest pain with mildly elevated troponin and was found to have non obstructive CAD with a normal LVEF and normal LVEDP. It was determined her pain was caused by wide complex tachycardia- possibly pacemaker mediated tachycardia. Patient presents today for routine follow up. Patient states overall she has been doing well. However her sister  2 weeks ago that she was very close with and lived with. She states since that time she has had a few episodes of chest pain. These episodes last only a few seconds. Not exertional. When she points to her \"chest pain\" she points to her upper abdomen. Otherwise doing okay. "     The following portions of the patient's history were reviewed and updated as appropriate: allergies, current medications, past family history, past medical history, past social history, past surgical history and problem list.    No Known Allergies    Current Outpatient Medications:   •  aspirin 81 MG EC tablet, Take 81 mg by mouth Daily., Disp: , Rfl:   •  carbidopa-levodopa (SINEMET)  MG per tablet, Take 1.5 tablets by mouth 2 (Two) Times a Day With Meals., Disp: , Rfl:   •  cholecalciferol (VITAMIN D3) 1000 units tablet, Take 1,000 Units by mouth 2 (Two) Times a Day., Disp: , Rfl:   •  dronedarone (MULTAQ) 400 MG tablet, Take 1 tablet by mouth 2 (Two) Times a Day With Meals., Disp: 60 tablet, Rfl: 11  •  folic acid (FOLVITE) 400 MCG tablet, Take 400 mcg by mouth Daily., Disp: , Rfl:   •  hydroxychloroquine (PLAQUENIL) 200 MG tablet, Take 200 mg by mouth Daily With Breakfast., Disp: , Rfl:   •  methotrexate 2.5 MG tablet, Take 10 mg by mouth 1 (One) Time Per Week., Disp: , Rfl:   •  omeprazole (priLOSEC) 20 MG capsule, Take 20 mg by mouth Daily., Disp: , Rfl:   •  predniSONE (DELTASONE) 1 MG tablet, Take 1 mg by mouth 2 (Two) Times a Day., Disp: , Rfl:   •  raloxifene (EVISTA) 60 MG tablet, Take 60 mg by mouth Daily., Disp: , Rfl:   •  rivaroxaban (XARELTO) 20 MG tablet, Take 1 tablet by mouth Daily With Dinner., Disp: 30 tablet, Rfl: 11    Social History     Socioeconomic History   • Marital status:      Spouse name: Not on file   • Number of children: Not on file   • Years of education: Not on file   • Highest education level: Not on file   Tobacco Use   • Smoking status: Former Smoker   • Smokeless tobacco: Never Used   Substance and Sexual Activity   • Alcohol use: No   • Drug use: No   • Sexual activity: Defer       Review of Systems   Constitution: Negative for chills, decreased appetite, fever, malaise/fatigue, weight gain and weight loss.   HENT: Negative for nosebleeds.    Eyes: Negative  for visual disturbance.   Cardiovascular: Positive for chest pain. Negative for dyspnea on exertion, leg swelling, near-syncope, orthopnea, palpitations, paroxysmal nocturnal dyspnea and syncope.   Respiratory: Negative for cough, hemoptysis, shortness of breath and snoring.    Endocrine: Negative for cold intolerance and heat intolerance.   Hematologic/Lymphatic: Negative for bleeding problem. Does not bruise/bleed easily.   Skin: Negative for rash.   Musculoskeletal: Negative for back pain and falls.   Gastrointestinal: Negative for abdominal pain, constipation, diarrhea, heartburn, melena, nausea and vomiting.   Genitourinary: Negative for hematuria.   Neurological: Negative for dizziness, headaches and light-headedness.   Psychiatric/Behavioral: Negative for altered mental status. The patient is nervous/anxious.    Allergic/Immunologic: Negative for persistent infections.              Objective:     Physical Exam   Constitutional: She is oriented to person, place, and time. She appears well-developed and well-nourished.   Tremor, difficult to understand speech. Tearful when discussing loss of sister   HENT:   Head: Normocephalic and atraumatic.   Eyes: Pupils are equal, round, and reactive to light.   Neck: Normal range of motion. Neck supple. No JVD present. Carotid bruit is not present.   Cardiovascular: Normal rate, regular rhythm and intact distal pulses.   Murmur heard.  Pulmonary/Chest: Effort normal and breath sounds normal.   Abdominal: Soft. Bowel sounds are normal.   Musculoskeletal: Normal range of motion.   Neurological: She is alert and oriented to person, place, and time. She has normal reflexes.   Skin: Skin is warm and dry.   Psychiatric: She has a normal mood and affect. Her behavior is normal. Judgment and thought content normal.           ECG 12 Lead  Date/Time: 7/31/2020 9:36 AM  Performed by: Todd Barillas APRN  Authorized by: Todd Barillas APRN   Comparison: compared with previous ECG  "from 1/31/2020  Rhythm: paced          /72   Pulse 68   Ht 157.5 cm (62\")   Wt 48.5 kg (107 lb)   SpO2 98%   BMI 19.57 kg/m²   Lab Review:   I have reviewed   Lab Results   Component Value Date    CHOL 79 (L) 09/01/2019    TRIG 63 09/01/2019    HDL 37 (L) 09/01/2019    LDL 43 09/01/2019         Assessment:          Diagnosis Plan   1. Chest pain, unspecified type     2. Atrial flutter with controlled response (CMS/HCC)     3. Heart block     4. Presence of cardiac pacemaker     5. Nonobstructive atherosclerosis of coronary artery            Plan:       1. Chest Pain- appears atypical and associated with emotional upset. Will follow. Nonobstructive disease on heart cath less than a year ago. Discussed options of starting Imdur- she wishes to wait for now. Will interrogate device today as she has had chest pain associated with pacemaker mediated tachycardia  2. Atrial Flutter- anticoagulated, asymptomatic. Rate controlled. On Xarelto without bleeding issue  3. Heart block- resolved s/p pacemaker  4. Pacemaker- will interrogate today to rule out cause of chest pain  5. nonobstructive coronary artery disease- having some atypical pain. Continue to monitor. Anticoagulated with Xarelto therefor not on aspirin. LDL at goal of less than 70    Follow up in 6 months. Call back sooner if chest pain persists or wishes to trial Imdur     "

## 2020-10-28 ENCOUNTER — TELEPHONE (OUTPATIENT)
Dept: CARDIOLOGY | Facility: CLINIC | Age: 75
End: 2020-10-28

## 2020-10-28 NOTE — TELEPHONE ENCOUNTER
PA Multaq approved through 12/31/2021        I called [facility] pharmacy and the price for 90 day supply is around $100.00.

## 2020-12-04 RX ORDER — RIVAROXABAN 20 MG/1
TABLET, FILM COATED ORAL
Qty: 90 TABLET | Refills: 4 | Status: SHIPPED | OUTPATIENT
Start: 2020-12-04

## 2021-03-16 ENCOUNTER — OFFICE VISIT (OUTPATIENT)
Dept: CARDIOLOGY | Facility: CLINIC | Age: 76
End: 2021-03-16

## 2021-03-16 VITALS
HEART RATE: 66 BPM | DIASTOLIC BLOOD PRESSURE: 70 MMHG | HEIGHT: 62 IN | SYSTOLIC BLOOD PRESSURE: 125 MMHG | WEIGHT: 113 LBS | OXYGEN SATURATION: 97 % | RESPIRATION RATE: 18 BRPM | BODY MASS INDEX: 20.8 KG/M2

## 2021-03-16 DIAGNOSIS — I48.92 ATRIAL FLUTTER WITH CONTROLLED RESPONSE (HCC): ICD-10-CM

## 2021-03-16 DIAGNOSIS — R07.9 CHEST PAIN, UNSPECIFIED TYPE: Primary | ICD-10-CM

## 2021-03-16 DIAGNOSIS — I25.10 NONOBSTRUCTIVE ATHEROSCLEROSIS OF CORONARY ARTERY: ICD-10-CM

## 2021-03-16 DIAGNOSIS — I44.1 SECOND DEGREE AV BLOCK, MOBITZ TYPE II: ICD-10-CM

## 2021-03-16 DIAGNOSIS — Z95.0 PRESENCE OF CARDIAC PACEMAKER: ICD-10-CM

## 2021-03-16 PROCEDURE — 99214 OFFICE O/P EST MOD 30 MIN: CPT | Performed by: NURSE PRACTITIONER

## 2021-03-16 PROCEDURE — 93000 ELECTROCARDIOGRAM COMPLETE: CPT | Performed by: NURSE PRACTITIONER

## 2021-03-16 RX ORDER — BENZTROPINE MESYLATE 0.5 MG/1
0.5 TABLET ORAL 2 TIMES DAILY
COMMUNITY
End: 2021-04-21

## 2021-03-16 NOTE — PROGRESS NOTES
Subjective:     Encounter Date:03/16/2021      Patient ID: Kristen Mittal is a 76 y.o. female     Chief Complaint:  Chest Pain   This is a recurrent problem. The current episode started more than 1 month ago. The problem occurs intermittently. Associated symptoms include palpitations. Pertinent negatives include no abdominal pain, back pain, cough, dizziness, fever, headaches, hemoptysis, malaise/fatigue, nausea, near-syncope, orthopnea, PND, shortness of breath, syncope or vomiting. Associated symptoms comments: Anxiety, emotional upset. The pain is aggravated by emotional upset.   Atrial Flutter  This is a chronic problem. The current episode started more than 1 year ago. The problem occurs intermittently. The problem has been unchanged. Associated symptoms include chest pain. Pertinent negatives include no abdominal pain, chills, coughing, fever, headaches, nausea, rash or vomiting.   Palpitations   This is a chronic problem. The current episode started more than 1 month ago. The problem occurs rarely. The problem has been unchanged. Associated symptoms include anxiety and chest pain. Pertinent negatives include no coughing, dizziness, fever, malaise/fatigue, nausea, near-syncope, shortness of breath, syncope or vomiting.     Patient presents today for routine follow up. Patient is followed for a history of nonobstructive coronary artery disease, pacemaker, complete heart block and atrial flutter. Patient had a heart cath in September 2019 due to chest pain with mildly elevated troponin and was found to have non obstructive CAD with a normal LVEF and normal LVEDP. It was determined her pain was caused by wide complex tachycardia- possibly pacemaker mediated tachycardia. Patient has rare chest pains- that she associates with stress. Not exertional. She notes rare palpitations. Overall she is stable. She has been having some hallucinations per her care provider with her here today.     The following portions of  the patient's history were reviewed and updated as appropriate: allergies, current medications, past family history, past medical history, past social history, past surgical history and problem list.    No Known Allergies    Current Outpatient Medications:   •  benztropine (COGENTIN) 0.5 MG tablet, Take 0.5 mg by mouth 2 (Two) Times a Day., Disp: , Rfl:   •  cholecalciferol (VITAMIN D3) 1000 units tablet, Take 1,000 Units by mouth 2 (Two) Times a Day., Disp: , Rfl:   •  dronedarone (Multaq) 400 MG tablet, Take 1 tablet by mouth 2 (Two) Times a Day With Meals., Disp: 180 tablet, Rfl: 4  •  folic acid (FOLVITE) 400 MCG tablet, Take 400 mcg by mouth Daily., Disp: , Rfl:   •  hydroxychloroquine (PLAQUENIL) 200 MG tablet, Take 200 mg by mouth Daily With Breakfast., Disp: , Rfl:   •  methotrexate 2.5 MG tablet, Take 10 mg by mouth 1 (One) Time Per Week., Disp: , Rfl:   •  omeprazole (priLOSEC) 20 MG capsule, Take 20 mg by mouth Daily., Disp: , Rfl:   •  predniSONE (DELTASONE) 1 MG tablet, Take 1 mg by mouth 2 (Two) Times a Day., Disp: , Rfl:   •  raloxifene (EVISTA) 60 MG tablet, Take 60 mg by mouth Daily., Disp: , Rfl:   •  Xarelto 20 MG tablet, TAKE 1 TABLET BY MOUTH ONCE DAILY WITH  DINNER, Disp: 90 tablet, Rfl: 4    Social History     Socioeconomic History   • Marital status:      Spouse name: Not on file   • Number of children: Not on file   • Years of education: Not on file   • Highest education level: Not on file   Tobacco Use   • Smoking status: Former Smoker   • Smokeless tobacco: Never Used   Substance and Sexual Activity   • Alcohol use: No   • Drug use: No   • Sexual activity: Defer       Review of Systems   Constitutional: Negative for chills, decreased appetite, fever, malaise/fatigue, weight gain and weight loss.   HENT: Negative for nosebleeds.    Eyes: Negative for visual disturbance.   Cardiovascular: Positive for chest pain and palpitations. Negative for dyspnea on exertion, leg swelling,  near-syncope, orthopnea, paroxysmal nocturnal dyspnea and syncope.   Respiratory: Negative for cough, hemoptysis, shortness of breath and snoring.    Endocrine: Negative for cold intolerance and heat intolerance.   Hematologic/Lymphatic: Negative for bleeding problem. Does not bruise/bleed easily.   Skin: Negative for rash.   Musculoskeletal: Negative for back pain and falls.   Gastrointestinal: Negative for abdominal pain, constipation, diarrhea, heartburn, melena, nausea and vomiting.   Genitourinary: Negative for hematuria.   Neurological: Negative for dizziness, headaches and light-headedness.   Psychiatric/Behavioral: Positive for hallucinations. Negative for altered mental status. The patient is nervous/anxious.    Allergic/Immunologic: Negative for persistent infections.              Objective:     Physical Exam  Constitutional:       Appearance: She is well-developed.      Comments: Tremor, difficult to understand speech. Tearful when discussing loss of sister   HENT:      Head: Normocephalic and atraumatic.   Eyes:      Pupils: Pupils are equal, round, and reactive to light.   Neck:      Vascular: No carotid bruit or JVD.   Cardiovascular:      Rate and Rhythm: Normal rate and regular rhythm.      Pulses: Intact distal pulses.      Heart sounds: Murmur present.   Pulmonary:      Effort: Pulmonary effort is normal.      Breath sounds: Normal breath sounds.   Abdominal:      General: Bowel sounds are normal.      Palpations: Abdomen is soft.   Musculoskeletal:         General: Normal range of motion.      Cervical back: Normal range of motion and neck supple.   Skin:     General: Skin is warm and dry.   Neurological:      Mental Status: She is alert and oriented to person, place, and time.      Deep Tendon Reflexes: Reflexes are normal and symmetric.   Psychiatric:         Behavior: Behavior normal.         Thought Content: Thought content normal.         Judgment: Judgment normal.             ECG 12  "Lead    Date/Time: 3/16/2021 11:46 AM  Performed by: Todd Barillas APRN  Authorized by: Todd Barillas APRN   Comparison: compared with previous ECG from 7/31/2020  Similar to previous ECG  Rhythm: paced          /70 (BP Location: Right arm, Patient Position: Sitting, Cuff Size: Adult)   Pulse 66   Resp 18   Ht 157.5 cm (62\")   Wt 51.3 kg (113 lb)   SpO2 97%   Breastfeeding No   BMI 20.67 kg/m²   Lab Review:   I have reviewed   Lab Results   Component Value Date    CHOL 79 (L) 09/01/2019    TRIG 63 09/01/2019    HDL 37 (L) 09/01/2019    LDL 43 09/01/2019         Assessment:          Diagnosis Plan   1. Chest pain, unspecified type     2. Atrial flutter with controlled response (CMS/HCC)     3. Presence of cardiac pacemaker     4. Nonobstructive atherosclerosis of coronary artery     5. Second degree AV block, Mobitz type II            Plan:       1. Chest Pain- no change in chest pain. Rare episode under stressful times  2. Atrial Flutter- anticoagulated, asymptomatic. Less than 1% burden. On Xarelto without bleeding issue  3 Pacemaker- last interrogation stable.   4. nonobstructive coronary artery disease- having some atypical pain. Continue to monitor. Anticoagulated with Xarelto therefor not on aspirin. LDL at goal of less than 70  5. Heart block resolved s/p pacemaker     Follow up in 6 months.  Follow with PCP for hallucinations    I spent 30 minutes caring for Kristen on this date of service. This time includes time spent by me in the following activities:preparing for the visit, reviewing tests, obtaining and/or reviewing a separately obtained history, performing a medically appropriate examination and/or evaluation , counseling and educating the patient/family/caregiver, ordering medications, tests, or procedures, referring and communicating with other health care professionals , documenting information in the medical record, independently interpreting results and communicating that " information with the patient/family/caregiver and care coordination

## 2021-04-12 ENCOUNTER — TRANSCRIBE ORDERS (OUTPATIENT)
Dept: ADMINISTRATIVE | Facility: HOSPITAL | Age: 76
End: 2021-04-12

## 2021-04-12 DIAGNOSIS — Q87.89 MELANOMA AND NEURAL SYSTEM TUMOR SYNDROME: Primary | ICD-10-CM

## 2021-04-15 ENCOUNTER — APPOINTMENT (OUTPATIENT)
Dept: PREADMISSION TESTING | Facility: HOSPITAL | Age: 76
End: 2021-04-15

## 2021-04-16 ENCOUNTER — TRANSCRIBE ORDERS (OUTPATIENT)
Dept: ADMINISTRATIVE | Facility: HOSPITAL | Age: 76
End: 2021-04-16

## 2021-04-21 ENCOUNTER — TRANSCRIBE ORDERS (OUTPATIENT)
Dept: ADMINISTRATIVE | Facility: HOSPITAL | Age: 76
End: 2021-04-21

## 2021-04-21 ENCOUNTER — LAB (OUTPATIENT)
Dept: LAB | Facility: HOSPITAL | Age: 76
End: 2021-04-21

## 2021-04-21 ENCOUNTER — PRE-ADMISSION TESTING (OUTPATIENT)
Dept: PREADMISSION TESTING | Facility: HOSPITAL | Age: 76
End: 2021-04-21

## 2021-04-21 VITALS
DIASTOLIC BLOOD PRESSURE: 46 MMHG | HEART RATE: 70 BPM | OXYGEN SATURATION: 100 % | WEIGHT: 109.35 LBS | RESPIRATION RATE: 16 BRPM | BODY MASS INDEX: 20.12 KG/M2 | HEIGHT: 62 IN | SYSTOLIC BLOOD PRESSURE: 111 MMHG

## 2021-04-21 DIAGNOSIS — Z11.59 SCREENING FOR VIRAL DISEASE: Primary | ICD-10-CM

## 2021-04-21 LAB
ALBUMIN SERPL-MCNC: 3.2 G/DL (ref 3.5–5.2)
ALBUMIN/GLOB SERPL: 1.4 G/DL
ALP SERPL-CCNC: 52 U/L (ref 39–117)
ALT SERPL W P-5'-P-CCNC: 25 U/L (ref 1–33)
ANION GAP SERPL CALCULATED.3IONS-SCNC: 9 MMOL/L (ref 5–15)
AST SERPL-CCNC: 51 U/L (ref 1–32)
BILIRUB SERPL-MCNC: 0.5 MG/DL (ref 0–1.2)
BUN SERPL-MCNC: 27 MG/DL (ref 8–23)
BUN/CREAT SERPL: 26 (ref 7–25)
CALCIUM SPEC-SCNC: 8 MG/DL (ref 8.6–10.5)
CHLORIDE SERPL-SCNC: 105 MMOL/L (ref 98–107)
CO2 SERPL-SCNC: 23 MMOL/L (ref 22–29)
CREAT SERPL-MCNC: 1.04 MG/DL (ref 0.57–1)
DEPRECATED RDW RBC AUTO: 52.7 FL (ref 37–54)
ERYTHROCYTE [DISTWIDTH] IN BLOOD BY AUTOMATED COUNT: 14.6 % (ref 12.3–15.4)
GFR SERPL CREATININE-BSD FRML MDRD: 51 ML/MIN/1.73
GLOBULIN UR ELPH-MCNC: 2.3 GM/DL
GLUCOSE SERPL-MCNC: 88 MG/DL (ref 65–99)
HCT VFR BLD AUTO: 30.5 % (ref 34–46.6)
HGB BLD-MCNC: 10.1 G/DL (ref 12–15.9)
MCH RBC QN AUTO: 33.2 PG (ref 26.6–33)
MCHC RBC AUTO-ENTMCNC: 33.1 G/DL (ref 31.5–35.7)
MCV RBC AUTO: 100.3 FL (ref 79–97)
PLATELET # BLD AUTO: 191 10*3/MM3 (ref 140–450)
PMV BLD AUTO: 9.2 FL (ref 6–12)
POTASSIUM SERPL-SCNC: 4.5 MMOL/L (ref 3.5–5.2)
PROT SERPL-MCNC: 5.5 G/DL (ref 6–8.5)
RBC # BLD AUTO: 3.04 10*6/MM3 (ref 3.77–5.28)
SARS-COV-2 RNA PNL SPEC NAA+PROBE: NOT DETECTED
SODIUM SERPL-SCNC: 137 MMOL/L (ref 136–145)
WBC # BLD AUTO: 7.55 10*3/MM3 (ref 3.4–10.8)

## 2021-04-21 PROCEDURE — C9803 HOPD COVID-19 SPEC COLLECT: HCPCS | Performed by: SPECIALIST

## 2021-04-21 PROCEDURE — 85027 COMPLETE CBC AUTOMATED: CPT

## 2021-04-21 PROCEDURE — 93010 ELECTROCARDIOGRAM REPORT: CPT | Performed by: INTERNAL MEDICINE

## 2021-04-21 PROCEDURE — 80053 COMPREHEN METABOLIC PANEL: CPT

## 2021-04-21 PROCEDURE — 36415 COLL VENOUS BLD VENIPUNCTURE: CPT

## 2021-04-21 PROCEDURE — 93005 ELECTROCARDIOGRAM TRACING: CPT

## 2021-04-21 PROCEDURE — 87635 SARS-COV-2 COVID-19 AMP PRB: CPT | Performed by: SPECIALIST

## 2021-04-21 RX ORDER — NITROGLYCERIN 0.4 MG/1
0.4 TABLET SUBLINGUAL
COMMUNITY

## 2021-04-21 NOTE — DISCHARGE INSTRUCTIONS
PATIENT/FAMILY/RESPONSIBLE PARTY VERBALIZES UNDERSTANDING OF ABOVE EDUCATION.  COPY OF PAIN SCALE GIVEN AND REVIEWED WITH VERBALIZED UNDERSTANDING.  DAY OF SURGERY INSTRUCTIONS        YOUR SURGEON: April Caleb     PROCEDURE: Wide Local Excision Of Left Forearm With Oxford Lymph Node Biopsy     DATE OF SURGERY: 04/22/21    ARRIVAL TIME: AS DIRECTED BY OFFICE    YOU MAY TAKE THE FOLLOWING MEDICATION(S) THE MORNING OF SURGERY WITH A SIP OF WATER:NONE      ALL OTHER HOME MEDICATION CHECK WITH YOUR PHYSICIAN      DO NOT TAKE ANY ERECTILE DYSFUNCTION MEDICATIONS (EX: CIALIS, VIAGRA) 24 HOURS PRIOR TO SURGERY                      MANAGING PAIN AFTER SURGERY    We know you are probably wondering what your pain will be like after surgery.  Following surgery it is unrealistic to expect you will not have pain.   Pain is how our bodies let us know that something is wrong or cautions us to be careful.  That said, our goal is to make your pain tolerable.    Methods we may use to treat your pain include (oral or IV medications, PCAs, epidurals, nerve blocks, etc.)   While some procedures require IV pain medications for a short time after surgery, transitioning to pain medications by mouth allows for better management of pain.   Your nurse will encourage you to take oral pain medications whenever possible.  IV medications work almost immediately, but only last a short while.  Taking medications by mouth allows for a more constant level of medication in your blood stream for a longer period of time.      Once your pain is out of control it is harder to get back under control.  It is important you are aware when your next dose of pain medication is due.  If you are admitted, your nurse may write the time of your next dose on the white board in your room to help you remember.      We are interested in your pain and encourage you to inform us about aggravating factors during your visit.   Many times a simple repositioning every few  hours can make a big difference.    If your physician says it is okay, do not let your pain prevent you from getting out of bed. Be sure to call your nurse for assistance prior to getting up so you do not fall.      Before surgery, please decide your tolerable pain goal.  These faces help describe the pain ratings we use on a 0-10 scale.   Be prepared to tell us your goal and whether or not you take pain or anxiety medications at home.          BEFORE YOU COME TO THE HOSPITAL  (Pre-op instructions)  • Do not eat, drink, smoke or chew gum after midnight the night before surgery.  This also includes no mints.  • Morning of surgery take only the medicines you have been instructed with a sip of water unless otherwise instructed  by your physician.  • Do not shave, wear makeup or dark nail polish.  • Remove all jewelry including rings.  • Leave anything you consider valuable at home.  • Leave your suitcase in the car until after your surgery.  • Bring the following with you if applicable:  o Picture ID and insurance, Medicare or Medicaid cards  o Co-pay/deductible required by insurance (cash, check, credit card)  o Copy of advance directive, living will or power-of- documents if not brought to PAT  o CPAP or BIPAP mask and tubing  o Relaxation aids ( book, magazine), etc.  o Hearing aids                        ON THE DAY OF SURGERY  · On the day of surgery check in at registration located at the main entrance of the hospital.   ? You will be registered and given a beeper with instructions where to wait in the main lobby.  ? When your beeper lights up and vibrates a member of the Outpatient Surgery staff will meet you at the double doors under the stair steps and escort you to your preoperative room.   · You may have cloth compression devices placed on your legs. These help to prevent blood clots and reduce swelling in your legs.  · An IV may be inserted into one of your veins.  · In the operating room, you may be  "given one or more of the following:  ? A medicine to help you relax (sedative).  ? A medicine to numb the area (local anesthetic).  ? A medicine to make you fall asleep (general anesthetic).  ? A medicine that is injected into an area of your body to numb everything below the injection site (regional anesthetic).  · Your surgical site will be marked or identified.  · You may be given an antibiotic through your IV to help prevent infection.  Contact a health care provider if you:  · Develop a fever of more than 100.4°F (38°C) or other feelings of illness during the 48 hours before your surgery.  · Have symptoms that get worse.  Have questions or concerns about your surgery    General Anesthesia/Surgery, Adult  General anesthesia is the use of medicines to make a person \"go to sleep\" (unconscious) for a medical procedure. General anesthesia must be used for certain procedures, and is often recommended for procedures that:  · Last a long time.  · Require you to be still or in an unusual position.  · Are major and can cause blood loss.  The medicines used for general anesthesia are called general anesthetics. As well as making you unconscious for a certain amount of time, these medicines:  · Prevent pain.  · Control your blood pressure.  · Relax your muscles.  Tell a health care provider about:  · Any allergies you have.  · All medicines you are taking, including vitamins, herbs, eye drops, creams, and over-the-counter medicines.  · Any problems you or family members have had with anesthetic medicines.  · Types of anesthetics you have had in the past.  · Any blood disorders you have.  · Any surgeries you have had.  · Any medical conditions you have.  · Any recent upper respiratory, chest, or ear infections.  · Any history of:  ? Heart or lung conditions, such as heart failure, sleep apnea, asthma, or chronic obstructive pulmonary disease (COPD).  ?  service.  ? Depression or anxiety.  · Any tobacco or drug use, " including marijuana or alcohol use.  · Whether you are pregnant or may be pregnant.  What are the risks?  Generally, this is a safe procedure. However, problems may occur, including:  · Allergic reaction.  · Lung and heart problems.  · Inhaling food or liquid from the stomach into the lungs (aspiration).  · Nerve injury.  · Air in the bloodstream, which can lead to stroke.  · Extreme agitation or confusion (delirium) when you wake up from the anesthetic.  · Waking up during your procedure and being unable to move. This is rare.  These problems are more likely to develop if you are having a major surgery or if you have an advanced or serious medical condition. You can prevent some of these complications by answering all of your health care provider's questions thoroughly and by following all instructions before your procedure.  General anesthesia can cause side effects, including:  · Nausea or vomiting.  · A sore throat from the breathing tube.  · Hoarseness.  · Wheezing or coughing.  · Shaking chills.  · Tiredness.  · Body aches.  · Anxiety.  · Sleepiness or drowsiness.  · Confusion or agitation.  RISKS AND COMPLICATIONS OF SURGERY  Your health care provider will discuss possible risks and complications with you before surgery. Common risks and complications include:    · Problems due to the use of anesthetics.  · Blood loss and replacement (does not apply to minor surgical procedures).  · Temporary increase in pain due to surgery.  · Uncorrected pain or problems that the surgery was meant to correct.  · Infection.  · New damage.    What happens before the procedure?    Medicines  Ask your health care provider about:  · Changing or stopping your regular medicines. This is especially important if you are taking diabetes medicines or blood thinners.  · Taking medicines such as aspirin and ibuprofen. These medicines can thin your blood. Do not take these medicines unless your health care provider tells you to take  them.  · Taking over-the-counter medicines, vitamins, herbs, and supplements. Do not take these during the week before your procedure unless your health care provider approves them.  General instructions  · Starting 3-6 weeks before the procedure, do not use any products that contain nicotine or tobacco, such as cigarettes and e-cigarettes. If you need help quitting, ask your health care provider.  · If you brush your teeth on the morning of the procedure, make sure to spit out all of the toothpaste.  · Tell your health care provider if you become ill or develop a cold, cough, or fever.  · If instructed by your health care provider, bring your sleep apnea device with you on the day of your surgery (if applicable).  · Ask your health care provider if you will be going home the same day, the following day, or after a longer hospital stay.  ? Plan to have someone take you home from the hospital or clinic.  ? Plan to have a responsible adult care for you for at least 24 hours after you leave the hospital or clinic. This is important.  What happens during the procedure?  · You will be given anesthetics through both of the following:  ? A mask placed over your nose and mouth.  ? An IV in one of your veins.  · You may receive a medicine to help you relax (sedative).  · After you are unconscious, a breathing tube may be inserted down your throat to help you breathe. This will be removed before you wake up.  · An anesthesia specialist will stay with you throughout your procedure. He or she will:  ? Keep you comfortable and safe by continuing to give you medicines and adjusting the amount of medicine that you get.  ? Monitor your blood pressure, pulse, and oxygen levels to make sure that the anesthetics do not cause any problems.  The procedure may vary among health care providers and hospitals.  What happens after the procedure?  · Your blood pressure, temperature, heart rate, breathing rate, and blood oxygen level will be  monitored until the medicines you were given have worn off.  · You will wake up in a recovery area. You may wake up slowly.  · If you feel anxious or agitated, you may be given medicine to help you calm down.  · If you will be going home the same day, your health care provider may check to make sure you can walk, drink, and urinate.  · Your health care provider will treat any pain or side effects you have before you go home.  · Do not drive for 24 hours if you were given a sedative.  Summary  · General anesthesia is used to keep you still and prevent pain during a procedure.  · It is important to tell your healthcare provider about your medical history and any surgeries you have had, and previous experience with anesthesia.  · Follow your healthcare provider’s instructions about when to stop eating, drinking, or taking certain medicines before your procedure.  · Plan to have someone take you home from the hospital or clinic.  This information is not intended to replace advice given to you by your health care provider. Make sure you discuss any questions you have with your health care provider.  Document Released: 03/26/2009 Document Revised: 08/03/2018 Document Reviewed: 08/03/2018  [employer] Interactive Patient Education © 2019 [employer]       Fall Prevention in Hospitals, Adult  As a hospital patient, your condition and the treatments you receive can increase your risk for falls. Some additional risk factors for falls in a hospital include:  · Being in an unfamiliar environment.  · Being on bed rest.  · Your surgery.  · Taking certain medicines.  · Your tubing requirements, such as intravenous (IV) therapy or catheters.  It is important that you learn how to decrease fall risks while at the hospital. Below are important tips that can help prevent falls.  SAFETY TIPS FOR PREVENTING FALLS  Talk about your risk of falling.  · Ask your health care provider why you are at risk for falling. Is it your medicine,  illness, tubing placement, or something else?  · Make a plan with your health care provider to keep you safe from falls.  · Ask your health care provider or pharmacist about side effects of your medicines. Some medicines can make you dizzy or affect your coordination.  Ask for help.  · Ask for help before getting out of bed. You may need to press your call button.  · Ask for assistance in getting safely to the toilet.  · Ask for a walker or cane to be put at your bedside. Ask that most of the side rails on your bed be placed up before your health care provider leaves the room.  · Ask family or friends to sit with you.  · Ask for things that are out of your reach, such as your glasses, hearing aids, telephone, bedside table, or call button.  Follow these tips to avoid falling:  · Stay lying or seated, rather than standing, while waiting for help.  · Wear rubber-soled slippers or shoes whenever you walk in the hospital.  · Avoid quick, sudden movements.  ¨ Change positions slowly.  ¨ Sit on the side of your bed before standing.  ¨ Stand up slowly and wait before you start to walk.  · Let your health care provider know if there is a spill on the floor.  · Pay careful attention to the medical equipment, electrical cords, and tubes around you.  · When you need help, use your call button by your bed or in the bathroom. Wait for one of your health care providers to help you.  · If you feel dizzy or unsure of your footing, return to bed and wait for assistance.  · Avoid being distracted by the TV, telephone, or another person in your room.  · Do not lean or support yourself on rolling objects, such as IV poles or bedside tables.     This information is not intended to replace advice given to you by your health care provider. Make sure you discuss any questions you have with your health care provider.     Document Released: 12/15/2001 Document Revised: 01/08/2016 Document Reviewed: 08/25/2013  [employer] Patient  Education ©2016 [employer]       [facility]  CHG 4% Patient Instruction Sheet    Chlorhexidine Before Surgery  Chlorhexidine gluconate (CHG) is a germ-killing (antiseptic) solution that is used to clean the skin. It gets rid of the bacteria that normally live on the skin. Cleaning your skin with CHG before surgery helps lower the risk for infection after surgery.    How to use CHG solution  · You will take 2 showers, one shower the night before surgery, the second shower the morning of surgery before coming to the hospital.  · Use CHG only as told by your health care provider, and follow the instructions on the label.  · Use CHG solution while taking a shower. Follow these steps when using CHG solution (unless your health care provider gives you different instructions):  1. Start the shower.  2. Use your normal soap and shampoo to wash your face and hair.  3. Turn off the shower or move out of the shower stream.  4. Pour the CHG onto a clean washcloth. Do not use any type of brush or rough-edged sponge.  5. Starting at your neck, lather your body down to your toes. Make sure you:  6. Pay special attention to the part of your body where you will be having surgery. Scrub this area for at least 1 minute.  7. Use the full amount of CHG as directed. Usually, this is one half bottle for each shower.  8. Do not use CHG on your head or face. If the solution gets into your ears or eyes, rinse them well with water.  9. Avoid your genital area.  10. Avoid any areas of skin that have broken skin, cuts, or scrapes.  11. Scrub your back and under your arms. Make sure to wash skin folds.  12. Let the lather sit on your skin for 1-2 minutes or as long as told by your health care  provider.  13. Thoroughly rinse your entire body in the shower. Make sure that all body creases and crevices are rinsed well.  14. Dry off with a clean towel. Do not put any substances on your body afterward, such as powder, lotion, or  perfume.  15. Put on clean clothes or pajamas.  16. If it is the night before your surgery, sleep in clean sheets.    What are the risks?  Risks of using CHG include:  · A skin reaction.  · Hearing loss, if CHG gets in your ears.  · Eye injury, if CHG gets in your eyes and is not rinsed out.  · The CHG product catching fire.  Make sure that you avoid smoking and flames after applying CHG to your skin.  Do not use CHG:  · If you have a chlorhexidine allergy or have previously reacted to chlorhexidine.  · On babies younger than 2 months of age.      On the day of surgery, when you are taken to your room in Outpatient Surgery you will be given a CHG prepackaged cloth to wipe the site for your surgery.  How to use CHG prepackaged cloths  · Follow the instructions on the label.  · Use the CHG cloth on clean, dry skin. Follow these steps when using a CHG cloth (unless your health care provider gives you different instructions):  1. Using the CHG cloth, vigorously scrub the part of your body where you will be having surgery. Scrub using a back-and-forth motion for 3 minutes. The area on your body should be completely wet with CHG when you are finished scrubbing.  2. Do not rinse. Discard the cloth and let the area air-dry for 1 minute. Do not put any substances on your body afterward, such as powder, lotion, or perfume.  Contact a health care provider if:  · Your skin gets irritated after scrubbing.  · You have questions about using your solution or cloth.  Get help right away if:  · Your eyes become very red or swollen.  · Your eyes itch badly.  · Your skin itches badly and is red or swollen.  · Your hearing changes.  · You have trouble seeing.  · You have swelling or tingling in your mouth or throat.  · You have trouble breathing.  · You swallow any chlorhexidine.  Summary  · Chlorhexidine gluconate (CHG) is a germ-killing (antiseptic) solution that is used to clean the skin. Cleaning your skin with CHG before surgery  helps lower the risk for infection after surgery.  · You may be given CHG to use at home. It may be in a bottle or in a prepackaged cloth to use on your skin. Carefully follow your health care provider's instructions and the instructions on the product label.  · Do not use CHG if you have a chlorhexidine allergy.  · Contact your health care provider if your skin gets irritated after scrubbing.  This information is not intended to replace advice given to you by your health care provider. Make sure you discuss any questions you have with your health care provider.  Document Released: 09/11/2013 Document Revised: 11/15/2018 Document Reviewed: 11/15/2018  [employer] Interactive Patient Education © 2019 [employer]

## 2021-04-22 ENCOUNTER — HOSPITAL ENCOUNTER (OUTPATIENT)
Dept: NUCLEAR MEDICINE | Facility: HOSPITAL | Age: 76
Discharge: HOME | End: 2021-04-22

## 2021-04-22 ENCOUNTER — ANESTHESIA EVENT (OUTPATIENT)
Dept: PERIOP | Facility: HOSPITAL | Age: 76
End: 2021-04-22

## 2021-04-22 ENCOUNTER — HOSPITAL ENCOUNTER (OUTPATIENT)
Facility: HOSPITAL | Age: 76
Setting detail: HOSPITAL OUTPATIENT SURGERY
Discharge: HOME | End: 2021-04-22
Attending: SPECIALIST | Admitting: SPECIALIST

## 2021-04-22 ENCOUNTER — ANESTHESIA (OUTPATIENT)
Dept: PERIOP | Facility: HOSPITAL | Age: 76
End: 2021-04-22

## 2021-04-22 VITALS
SYSTOLIC BLOOD PRESSURE: 184 MMHG | OXYGEN SATURATION: 98 % | DIASTOLIC BLOOD PRESSURE: 74 MMHG | TEMPERATURE: 97.7 F | RESPIRATION RATE: 16 BRPM | HEART RATE: 70 BPM

## 2021-04-22 DIAGNOSIS — Q87.89 MELANOMA AND NEURAL SYSTEM TUMOR SYNDROME: ICD-10-CM

## 2021-04-22 DIAGNOSIS — C43.9 MELANOMA (HCC): ICD-10-CM

## 2021-04-22 PROCEDURE — A9541 TC99M SULFUR COLLOID: HCPCS | Performed by: SPECIALIST

## 2021-04-22 PROCEDURE — 25010000002 FENTANYL CITRATE (PF) 100 MCG/2ML SOLUTION: Performed by: ANESTHESIOLOGY

## 2021-04-22 PROCEDURE — 25010000002 FENTANYL CITRATE (PF) 100 MCG/2ML SOLUTION: Performed by: NURSE ANESTHETIST, CERTIFIED REGISTERED

## 2021-04-22 PROCEDURE — 0 TECHNETIUM FILTERED SULFUR COLLOID: Performed by: SPECIALIST

## 2021-04-22 PROCEDURE — 88305 TISSUE EXAM BY PATHOLOGIST: CPT | Performed by: SPECIALIST

## 2021-04-22 PROCEDURE — 25010000002 PROPOFOL 10 MG/ML EMULSION: Performed by: NURSE ANESTHETIST, CERTIFIED REGISTERED

## 2021-04-22 PROCEDURE — 78195 LYMPH SYSTEM IMAGING: CPT

## 2021-04-22 PROCEDURE — 25010000002 ONDANSETRON PER 1 MG: Performed by: NURSE ANESTHETIST, CERTIFIED REGISTERED

## 2021-04-22 RX ORDER — FENTANYL CITRATE 50 UG/ML
25 INJECTION, SOLUTION INTRAMUSCULAR; INTRAVENOUS
Status: DISCONTINUED | OUTPATIENT
Start: 2021-04-22 | End: 2021-04-22 | Stop reason: HOSPADM

## 2021-04-22 RX ORDER — PROPOFOL 10 MG/ML
VIAL (ML) INTRAVENOUS AS NEEDED
Status: DISCONTINUED | OUTPATIENT
Start: 2021-04-22 | End: 2021-04-22 | Stop reason: SURG

## 2021-04-22 RX ORDER — OXYCODONE AND ACETAMINOPHEN 7.5; 325 MG/1; MG/1
1 TABLET ORAL EVERY 4 HOURS PRN
Status: CANCELLED | OUTPATIENT
Start: 2021-04-22 | End: 2021-05-02

## 2021-04-22 RX ORDER — LIDOCAINE HYDROCHLORIDE 10 MG/ML
0.5 INJECTION, SOLUTION EPIDURAL; INFILTRATION; INTRACAUDAL; PERINEURAL ONCE AS NEEDED
Status: DISCONTINUED | OUTPATIENT
Start: 2021-04-22 | End: 2021-04-22 | Stop reason: HOSPADM

## 2021-04-22 RX ORDER — OXYCODONE HYDROCHLORIDE AND ACETAMINOPHEN 5; 325 MG/1; MG/1
1 TABLET ORAL ONCE AS NEEDED
Status: DISCONTINUED | OUTPATIENT
Start: 2021-04-22 | End: 2021-04-22 | Stop reason: HOSPADM

## 2021-04-22 RX ORDER — SODIUM CHLORIDE, SODIUM LACTATE, POTASSIUM CHLORIDE, CALCIUM CHLORIDE 600; 310; 30; 20 MG/100ML; MG/100ML; MG/100ML; MG/100ML
1000 INJECTION, SOLUTION INTRAVENOUS CONTINUOUS
Status: DISCONTINUED | OUTPATIENT
Start: 2021-04-22 | End: 2021-04-22 | Stop reason: HOSPADM

## 2021-04-22 RX ORDER — MAGNESIUM HYDROXIDE 1200 MG/15ML
LIQUID ORAL AS NEEDED
Status: DISCONTINUED | OUTPATIENT
Start: 2021-04-22 | End: 2021-04-22 | Stop reason: HOSPADM

## 2021-04-22 RX ORDER — NALOXONE HCL 0.4 MG/ML
0.4 VIAL (ML) INJECTION AS NEEDED
Status: DISCONTINUED | OUTPATIENT
Start: 2021-04-22 | End: 2021-04-22 | Stop reason: HOSPADM

## 2021-04-22 RX ORDER — SODIUM CHLORIDE 0.9 % (FLUSH) 0.9 %
3 SYRINGE (ML) INJECTION AS NEEDED
Status: DISCONTINUED | OUTPATIENT
Start: 2021-04-22 | End: 2021-04-22 | Stop reason: HOSPADM

## 2021-04-22 RX ORDER — LABETALOL HYDROCHLORIDE 5 MG/ML
5 INJECTION, SOLUTION INTRAVENOUS
Status: DISCONTINUED | OUTPATIENT
Start: 2021-04-22 | End: 2021-04-22 | Stop reason: HOSPADM

## 2021-04-22 RX ORDER — ONDANSETRON 2 MG/ML
4 INJECTION INTRAMUSCULAR; INTRAVENOUS ONCE AS NEEDED
Status: DISCONTINUED | OUTPATIENT
Start: 2021-04-22 | End: 2021-04-22 | Stop reason: HOSPADM

## 2021-04-22 RX ORDER — ACETAMINOPHEN 500 MG
1000 TABLET ORAL ONCE
Status: DISCONTINUED | OUTPATIENT
Start: 2021-04-22 | End: 2021-04-22 | Stop reason: HOSPADM

## 2021-04-22 RX ORDER — FENTANYL CITRATE 50 UG/ML
INJECTION, SOLUTION INTRAMUSCULAR; INTRAVENOUS AS NEEDED
Status: DISCONTINUED | OUTPATIENT
Start: 2021-04-22 | End: 2021-04-22 | Stop reason: SURG

## 2021-04-22 RX ORDER — BUPIVACAINE HYDROCHLORIDE AND EPINEPHRINE 2.5; 5 MG/ML; UG/ML
INJECTION, SOLUTION INFILTRATION; PERINEURAL AS NEEDED
Status: DISCONTINUED | OUTPATIENT
Start: 2021-04-22 | End: 2021-04-22 | Stop reason: HOSPADM

## 2021-04-22 RX ORDER — HYDROCODONE BITARTRATE AND ACETAMINOPHEN 5; 325 MG/1; MG/1
1 TABLET ORAL ONCE AS NEEDED
Status: DISCONTINUED | OUTPATIENT
Start: 2021-04-22 | End: 2021-04-22 | Stop reason: HOSPADM

## 2021-04-22 RX ORDER — FAMOTIDINE 10 MG/ML
20 INJECTION, SOLUTION INTRAVENOUS
Status: COMPLETED | OUTPATIENT
Start: 2021-04-22 | End: 2021-04-22

## 2021-04-22 RX ORDER — SODIUM CHLORIDE, SODIUM LACTATE, POTASSIUM CHLORIDE, CALCIUM CHLORIDE 600; 310; 30; 20 MG/100ML; MG/100ML; MG/100ML; MG/100ML
100 INJECTION, SOLUTION INTRAVENOUS CONTINUOUS
Status: DISCONTINUED | OUTPATIENT
Start: 2021-04-22 | End: 2021-04-22 | Stop reason: HOSPADM

## 2021-04-22 RX ORDER — ONDANSETRON 2 MG/ML
INJECTION INTRAMUSCULAR; INTRAVENOUS AS NEEDED
Status: DISCONTINUED | OUTPATIENT
Start: 2021-04-22 | End: 2021-04-22 | Stop reason: SURG

## 2021-04-22 RX ORDER — HYDROCODONE BITARTRATE AND ACETAMINOPHEN 5; 325 MG/1; MG/1
1-2 TABLET ORAL EVERY 4 HOURS PRN
Qty: 20 TABLET | Refills: 0 | Status: SHIPPED | OUTPATIENT
Start: 2021-04-22

## 2021-04-22 RX ORDER — IBUPROFEN 600 MG/1
600 TABLET ORAL ONCE AS NEEDED
Status: DISCONTINUED | OUTPATIENT
Start: 2021-04-22 | End: 2021-04-22 | Stop reason: HOSPADM

## 2021-04-22 RX ORDER — LIDOCAINE HYDROCHLORIDE 20 MG/ML
INJECTION, SOLUTION EPIDURAL; INFILTRATION; INTRACAUDAL; PERINEURAL AS NEEDED
Status: DISCONTINUED | OUTPATIENT
Start: 2021-04-22 | End: 2021-04-22 | Stop reason: SURG

## 2021-04-22 RX ORDER — FLUMAZENIL 0.1 MG/ML
0.2 INJECTION INTRAVENOUS AS NEEDED
Status: DISCONTINUED | OUTPATIENT
Start: 2021-04-22 | End: 2021-04-22 | Stop reason: HOSPADM

## 2021-04-22 RX ORDER — IBUPROFEN 200 MG
CAPSULE ORAL AS NEEDED
Status: DISCONTINUED | OUTPATIENT
Start: 2021-04-22 | End: 2021-04-22 | Stop reason: HOSPADM

## 2021-04-22 RX ORDER — SODIUM CHLORIDE 0.9 % (FLUSH) 0.9 %
3 SYRINGE (ML) INJECTION EVERY 12 HOURS SCHEDULED
Status: DISCONTINUED | OUTPATIENT
Start: 2021-04-22 | End: 2021-04-22 | Stop reason: HOSPADM

## 2021-04-22 RX ORDER — SODIUM CHLORIDE 0.9 % (FLUSH) 0.9 %
3-10 SYRINGE (ML) INJECTION AS NEEDED
Status: DISCONTINUED | OUTPATIENT
Start: 2021-04-22 | End: 2021-04-22 | Stop reason: HOSPADM

## 2021-04-22 RX ADMIN — FAMOTIDINE 20 MG: 10 INJECTION INTRAVENOUS at 09:43

## 2021-04-22 RX ADMIN — FENTANYL CITRATE 25 MCG: 50 INJECTION, SOLUTION INTRAMUSCULAR; INTRAVENOUS at 10:28

## 2021-04-22 RX ADMIN — FENTANYL CITRATE 50 MCG: 50 INJECTION, SOLUTION INTRAMUSCULAR; INTRAVENOUS at 10:44

## 2021-04-22 RX ADMIN — FENTANYL CITRATE 50 MCG: 50 INJECTION, SOLUTION INTRAMUSCULAR; INTRAVENOUS at 10:14

## 2021-04-22 RX ADMIN — LIDOCAINE HYDROCHLORIDE 100 MG: 20 INJECTION, SOLUTION EPIDURAL; INFILTRATION; INTRACAUDAL; PERINEURAL at 10:06

## 2021-04-22 RX ADMIN — FENTANYL CITRATE 25 MCG: 50 INJECTION, SOLUTION INTRAMUSCULAR; INTRAVENOUS at 11:41

## 2021-04-22 RX ADMIN — FENTANYL CITRATE 25 MCG: 50 INJECTION, SOLUTION INTRAMUSCULAR; INTRAVENOUS at 11:46

## 2021-04-22 RX ADMIN — FENTANYL CITRATE 25 MCG: 50 INJECTION, SOLUTION INTRAMUSCULAR; INTRAVENOUS at 10:51

## 2021-04-22 RX ADMIN — FENTANYL CITRATE 25 MCG: 50 INJECTION, SOLUTION INTRAMUSCULAR; INTRAVENOUS at 10:41

## 2021-04-22 RX ADMIN — SODIUM CHLORIDE, POTASSIUM CHLORIDE, SODIUM LACTATE AND CALCIUM CHLORIDE 1000 ML: 600; 310; 30; 20 INJECTION, SOLUTION INTRAVENOUS at 07:30

## 2021-04-22 RX ADMIN — CEFAZOLIN 1 G: 1 INJECTION, POWDER, FOR SOLUTION INTRAMUSCULAR; INTRAVENOUS; PARENTERAL at 10:14

## 2021-04-22 RX ADMIN — FENTANYL CITRATE 25 MCG: 50 INJECTION, SOLUTION INTRAMUSCULAR; INTRAVENOUS at 10:19

## 2021-04-22 RX ADMIN — TECHNETIUM TC 99M SULFUR COLLOID 1 DOSE: KIT at 07:50

## 2021-04-22 RX ADMIN — ONDANSETRON HYDROCHLORIDE 4 MG: 2 SOLUTION INTRAMUSCULAR; INTRAVENOUS at 10:18

## 2021-04-22 RX ADMIN — PROPOFOL 150 MG: 10 INJECTION, EMULSION INTRAVENOUS at 10:06

## 2021-04-22 NOTE — ANESTHESIA PREPROCEDURE EVALUATION
Anesthesia Evaluation     history of anesthetic complications: PONV  NPO Solid Status: > 8 hours  NPO Liquid Status: > 8 hours           Airway   Mallampati: I  TM distance: >3 FB  Neck ROM: full  Dental      Pulmonary    (+) COPD,   Cardiovascular   Exercise tolerance: poor (<4 METS)    (+) pacemaker pacemaker, past MI , CAD, dysrhythmias (mobitz II, pacemaker in place),     ROS comment: NSTEMI 2019  Heart cath  Impression:  1.  Non-obstructive coronary artery disease.  2.  Normal LVEF.  3.  Normal LVEDP.  4.  Short bursts of wide-complex tachycardia during the case.    [employer] pacemaker report reviewed- 99% RV paced, battery good    Neuro/Psych  (+) CVA residual symptoms,       ROS Comment: Parkinson disease  GI/Hepatic/Renal/Endo    (+)  GERD,    (-) diabetes    Musculoskeletal     Abdominal    Substance History      OB/GYN          Other   arthritis (Rheumatoid arthritis),                      Anesthesia Plan    ASA 3     general   (Place magnet on device, interrogate pacemaker in recovery)  intravenous induction     Anesthetic plan, all risks, benefits, and alternatives have been provided, discussed and informed consent has been obtained with: patient.

## 2021-04-22 NOTE — ANESTHESIA POSTPROCEDURE EVALUATION
Patient: Kristen Mittal    Procedure Summary     Date: 04/22/21 Room / Location:  [facility] OR  /  [facility]    Anesthesia Start: 1003 Anesthesia Stop: 1106    Procedure: SENTINEL NODE BX LEFT AXILLA. WIDE LOCAL EXCISION OF LEFT FOREARM MELANOMA  LESION. (Left Arm Upper) Diagnosis: (MELANOMA)    Surgeons: Ginger Ellis MD Provider: Snana Richardson CRNA    Anesthesia Type: general ASA Status: 3          Anesthesia Type: general    Vitals  Vitals Value Taken Time   /76 04/22/21 1214   Temp 97.7 °F (36.5 °C) 04/22/21 1104   Pulse 70 04/22/21 1224   Resp 16 04/22/21 1144   SpO2 98 % 04/22/21 1224   Vitals shown include unvalidated device data.        Post Anesthesia Care and Evaluation    Patient location during evaluation: PACU  Patient participation: complete - patient participated  Level of consciousness: awake and alert  Pain management: adequate  Airway patency: patent  Anesthetic complications: No anesthetic complications  PONV Status: none  Cardiovascular status: acceptable and hemodynamically stable  Respiratory status: acceptable  Hydration status: acceptable    Comments: Blood pressure (!) 184/74, pulse 70, temperature 97.7 °F (36.5 °C), temperature source Temporal, resp. rate 16, SpO2 98 %, not currently breastfeeding.    Patient discharged from PACU based upon Aamir score. Please see RN notes for further details

## 2021-04-22 NOTE — ANESTHESIA PROCEDURE NOTES
Airway  Urgency: elective    Date/Time: 4/22/2021 10:07 AM  Airway not difficult    General Information and Staff    Patient location during procedure: OR  CRNA: Sanna Richardson CRNA    Indications and Patient Condition  Indications for airway management: airway protection    Preoxygenated: yes  Mask difficulty assessment: 1 - vent by mask    Final Airway Details  Final airway type: supraglottic airway      Successful airway: I-gel  Size 3    Number of attempts at approach: 1  Assessment: lips, teeth, and gum same as pre-op

## 2021-04-23 LAB
QT INTERVAL: 480 MS
QTC INTERVAL: 518 MS

## 2021-04-26 LAB
CYTO UR: NORMAL
LAB AP CASE REPORT: NORMAL
LAB AP SYNOPTIC CHECKLIST: NORMAL
PATH REPORT.FINAL DX SPEC: NORMAL
PATH REPORT.GROSS SPEC: NORMAL

## 2021-05-11 PROCEDURE — 93294 REM INTERROG EVL PM/LDLS PM: CPT | Performed by: INTERNAL MEDICINE

## 2021-05-11 PROCEDURE — 93296 REM INTERROG EVL PM/IDS: CPT | Performed by: INTERNAL MEDICINE

## 2021-06-03 ENCOUNTER — DOCUMENTATION (OUTPATIENT)
Dept: CARDIOLOGY | Facility: CLINIC | Age: 76
End: 2021-06-03
